# Patient Record
Sex: FEMALE | Race: WHITE | Employment: FULL TIME | URBAN - METROPOLITAN AREA
[De-identification: names, ages, dates, MRNs, and addresses within clinical notes are randomized per-mention and may not be internally consistent; named-entity substitution may affect disease eponyms.]

---

## 2019-04-05 ENCOUNTER — HOSPITAL ENCOUNTER (OUTPATIENT)
Dept: MRI IMAGING | Age: 34
Discharge: HOME | End: 2019-04-05
Attending: ORTHOPAEDIC SURGERY
Payer: MEDICAID

## 2019-04-05 DIAGNOSIS — M25.512 CHRONIC LEFT SHOULDER PAIN: ICD-10-CM

## 2019-04-05 DIAGNOSIS — S43.432A SUPERIOR GLENOID LABRUM LESION OF LEFT SHOULDER: ICD-10-CM

## 2019-04-05 DIAGNOSIS — G89.29 CHRONIC LEFT SHOULDER PAIN: ICD-10-CM

## 2019-04-05 PROCEDURE — 73221 MRI JOINT UPR EXTREM W/O DYE: CPT

## 2019-10-28 ENCOUNTER — IP HISTORICAL/CONVERTED ENCOUNTER (OUTPATIENT)
Dept: OTHER | Age: 34
End: 2019-10-28

## 2020-04-19 ENCOUNTER — ED HISTORICAL/CONVERTED ENCOUNTER (OUTPATIENT)
Dept: OTHER | Age: 35
End: 2020-04-19

## 2020-07-23 ENCOUNTER — HOSPITAL ENCOUNTER (OUTPATIENT)
Dept: MRI IMAGING | Age: 35
Discharge: HOME | End: 2020-07-23
Attending: ORTHOPAEDIC SURGERY
Payer: MEDICAID

## 2020-07-23 DIAGNOSIS — M23.8X1 CHONDRAL DEFECT OF RIGHT PATELLA: ICD-10-CM

## 2020-07-23 PROCEDURE — 73721 MRI JNT OF LWR EXTRE W/O DYE: CPT

## 2020-10-30 ENCOUNTER — HOSPITAL ENCOUNTER (EMERGENCY)
Age: 35
Discharge: HOME | End: 2020-10-30
Attending: EMERGENCY MEDICINE
Payer: MEDICAID

## 2020-10-30 VITALS
SYSTOLIC BLOOD PRESSURE: 147 MMHG | HEART RATE: 109 BPM | TEMPERATURE: 98.7 F | OXYGEN SATURATION: 95 % | RESPIRATION RATE: 18 BRPM | DIASTOLIC BLOOD PRESSURE: 95 MMHG

## 2020-10-30 DIAGNOSIS — M54.41 ACUTE LOW BACK PAIN WITH RIGHT-SIDED SCIATICA, UNSPECIFIED BACK PAIN LATERALITY: Primary | ICD-10-CM

## 2020-10-30 PROCEDURE — 74011250636 HC RX REV CODE- 250/636: Performed by: EMERGENCY MEDICINE

## 2020-10-30 PROCEDURE — 99282 EMERGENCY DEPT VISIT SF MDM: CPT

## 2020-10-30 PROCEDURE — 96372 THER/PROPH/DIAG INJ SC/IM: CPT

## 2020-10-30 RX ORDER — METOPROLOL TARTRATE 50 MG/1
50 TABLET ORAL ONCE
COMMUNITY

## 2020-10-30 RX ORDER — METHYLPREDNISOLONE 4 MG/1
TABLET ORAL
Qty: 1 DOSE PACK | Refills: 0 | Status: SHIPPED | OUTPATIENT
Start: 2020-10-30 | End: 2020-10-30 | Stop reason: SDUPTHER

## 2020-10-30 RX ORDER — KETOROLAC TROMETHAMINE 30 MG/ML
60 INJECTION, SOLUTION INTRAMUSCULAR; INTRAVENOUS
Status: COMPLETED | OUTPATIENT
Start: 2020-10-30 | End: 2020-10-30

## 2020-10-30 RX ORDER — ACETAMINOPHEN AND CODEINE PHOSPHATE 300; 30 MG/1; MG/1
1 TABLET ORAL
Qty: 18 TAB | Refills: 0 | Status: SHIPPED | OUTPATIENT
Start: 2020-10-30 | End: 2020-10-30 | Stop reason: SDUPTHER

## 2020-10-30 RX ORDER — ACETAMINOPHEN AND CODEINE PHOSPHATE 300; 30 MG/1; MG/1
1 TABLET ORAL
Qty: 18 TAB | Refills: 0 | Status: SHIPPED | OUTPATIENT
Start: 2020-10-30 | End: 2020-11-02

## 2020-10-30 RX ORDER — METHYLPREDNISOLONE 4 MG/1
TABLET ORAL
Qty: 1 DOSE PACK | Refills: 0 | Status: SHIPPED | OUTPATIENT
Start: 2020-10-30 | End: 2020-11-28

## 2020-10-30 RX ADMIN — KETOROLAC TROMETHAMINE 60 MG: 30 INJECTION, SOLUTION INTRAMUSCULAR at 16:12

## 2020-10-30 RX ADMIN — METHYLPREDNISOLONE SODIUM SUCCINATE 125 MG: 125 INJECTION, POWDER, FOR SOLUTION INTRAMUSCULAR; INTRAVENOUS at 16:12

## 2020-10-30 NOTE — ED PROVIDER NOTES
EMERGENCY DEPARTMENT HISTORY AND PHYSICAL EXAM      Date: 10/30/2020  Patient Name: Jony Puri    History of Presenting Illness     Chief Complaint   Patient presents with    Back Pain    Leg Pain       History Provided By: Patient    HPI: Jony Puri, 37 y.o. female with a past medical history significant Fibromyalgia and hypertension presents to the ED with cc of low back pain of insidious onset no relieving factors. She complains of focal pain over the right sacroiliac joint region. No history of falls or other injuries. There are no other complaints, changes, or physical findings at this time. PCP: Delores Jenkins NP    No current facility-administered medications on file prior to encounter. Current Outpatient Medications on File Prior to Encounter   Medication Sig Dispense Refill    metoprolol tartrate (LOPRESSOR) 50 mg tablet Take 50 mg by mouth once.  DULoxetine (CYMBALTA) 60 mg capsule Take 60 mg by mouth daily. Past History     Past Medical History:  Past Medical History:   Diagnosis Date    Fibromyalgia     Hypertension        Past Surgical History:  Past Surgical History:   Procedure Laterality Date    HX APPENDECTOMY         Family History:  History reviewed. No pertinent family history. Social History:  Social History     Tobacco Use    Smoking status: Never Smoker    Smokeless tobacco: Never Used   Substance Use Topics    Alcohol use: No    Drug use: No       Allergies:  No Known Allergies      Review of Systems     Review of Systems   Constitutional: Negative. HENT: Negative. Eyes: Negative. Respiratory: Negative. Cardiovascular: Negative. Gastrointestinal: Negative. Endocrine: Negative. Genitourinary: Negative. Musculoskeletal: Positive for back pain. Skin: Negative. Neurological: Negative. Hematological: Negative. Psychiatric/Behavioral: Negative. All other systems reviewed and are negative.       Physical Exam Physical Exam  Vitals signs and nursing note reviewed. Constitutional:       Appearance: Normal appearance. She is normal weight. HENT:      Head: Normocephalic and atraumatic. Nose: Nose normal.      Mouth/Throat:      Mouth: Mucous membranes are moist.      Pharynx: Oropharynx is clear. Eyes:      Extraocular Movements: Extraocular movements intact. Conjunctiva/sclera: Conjunctivae normal.      Pupils: Pupils are equal, round, and reactive to light. Neck:      Musculoskeletal: Normal range of motion and neck supple. Cardiovascular:      Rate and Rhythm: Normal rate and regular rhythm. Pulses: Normal pulses. Heart sounds: Normal heart sounds. Pulmonary:      Effort: Pulmonary effort is normal.      Breath sounds: Normal breath sounds. Abdominal:      General: Abdomen is flat. Palpations: Abdomen is soft. Musculoskeletal: Normal range of motion. General: Tenderness present. Comments: Low back tenderness and tenderness over the right sacroiliac joint   Skin:     General: Skin is warm and dry. Capillary Refill: Capillary refill takes less than 2 seconds. Neurological:      General: No focal deficit present. Mental Status: She is alert and oriented to person, place, and time. Psychiatric:         Mood and Affect: Mood normal.         Behavior: Behavior normal.         Lab and Diagnostic Study Results     Labs -   No results found for this or any previous visit (from the past 12 hour(s)). Radiologic Studies -     CT Results  (Last 48 hours)    None        CXR Results  (Last 48 hours)    None            Medical Decision Making   I am the first provider for this patient. I reviewed the vital signs, available nursing notes, past medical history, past surgical history, family history and social history. Vital Signs-Reviewed the patient's vital signs. No data found.     Records Reviewed: Nursing Notes    ED Course:   Initial assessment performed. The patients presenting problems have been discussed, and they are in agreement with the care plan formulated and outlined with them. I have encouraged them to ask questions as they arise throughout their visit. Provider Notes (Medical Decision Making): Uneventful ED course, clinical improvement with therapy, patient will be discharged to followup with PCP as directed      Disposition   Disposition: Condition stable  Home     Discharged    DISCHARGE PLAN:  1. Current Discharge Medication List      CONTINUE these medications which have NOT CHANGED    Details   metoprolol tartrate (LOPRESSOR) 50 mg tablet Take 50 mg by mouth once. DULoxetine (CYMBALTA) 60 mg capsule Take 60 mg by mouth daily. 2.   Follow-up Information     Follow up With Specialties Details Why Contact Info    John Conroy NP Nurse Practitioner In 2 days  [address] [facility]  [phone]          3. Return to ED if worse   4. Discharge Medication List as of 10/30/2020  4:26 PM      CONTINUE these medications which have CHANGED    Details   acetaminophen-codeine (Tylenol-Codeine #3) 300-30 mg per tablet Take 1 Tab by mouth every four (4) hours as needed for Pain for up to 3 days. Max Daily Amount: 6 Tabs., Normal, Disp-18 Tab,R-0      methylPREDNISolone (Medrol, Elie,) 4 mg tablet Take as directed, Normal, Disp-1 Dose Pack,R-0         CONTINUE these medications which have NOT CHANGED    Details   metoprolol tartrate (LOPRESSOR) 50 mg tablet Take 50 mg by mouth once., Historical Med      DULoxetine (CYMBALTA) 60 mg capsule Take 60 mg by mouth daily. , Historical Med               Diagnosis     Clinical Impression:   1. Acute low back pain with right-sided sciatica, unspecified back pain laterality        Attestations:    Barrie Wallace MD    Please note that this dictation was completed with [employer], the [employer] voice recognition software.   Quite often unanticipated grammatical, syntax, homophones, and other interpretive errors are inadvertently transcribed by the computer software. Please disregard these errors. Please excuse any errors that have escaped final proofreading. Thank you. Patients ED visit today does not show an emergent medical condition that requires inpatient admission; The diagnosis, treatment plan and the need for follow up have been discussed in detail with the patient and he has been given the opportunity to ask questions. All such questions at this time, have been satisfactorily answered; patient remains in stable condition for discharge.

## 2020-10-30 NOTE — ED TRIAGE NOTES
Patient has lower back pain and right leg pain since July with no relief from many medications reports relief with prednisone but it has come back

## 2020-10-30 NOTE — LETTER
[address] [place] [address] 
[phone] Work/School Note Date: 10/30/2020 To Whom It May concern: 
 
Schuyler Candelario was seen and treated today in the emergency room by the following provider(s): 
Attending Provider: Lida Eddy MD. Schuyler Candelario is excused from work/school on 10/30/20 and 10/31/20. She is medically clear to return to work/school on 11/1/2020. Sincerely, Doyle Rhodes

## 2020-10-30 NOTE — DISCHARGE INSTRUCTIONS
Follow-up with your PCP for referral for advanced physical therapy as an outpatient. Take medicines as prescribed. Visit [email or web address] for further information on local clinics for lumbar decompression therapy.

## 2020-11-28 ENCOUNTER — HOSPITAL ENCOUNTER (EMERGENCY)
Age: 35
Discharge: HOME | End: 2020-11-28
Attending: EMERGENCY MEDICINE
Payer: MEDICAID

## 2020-11-28 ENCOUNTER — APPOINTMENT (OUTPATIENT)
Dept: GENERAL RADIOLOGY | Age: 35
End: 2020-11-28
Attending: EMERGENCY MEDICINE
Payer: MEDICAID

## 2020-11-28 VITALS
TEMPERATURE: 98.3 F | RESPIRATION RATE: 20 BRPM | BODY MASS INDEX: 40.02 KG/M2 | DIASTOLIC BLOOD PRESSURE: 78 MMHG | OXYGEN SATURATION: 98 % | HEART RATE: 83 BPM | HEIGHT: 67 IN | WEIGHT: 255 LBS | SYSTOLIC BLOOD PRESSURE: 130 MMHG

## 2020-11-28 DIAGNOSIS — Z20.822 SUSPECTED COVID-19 VIRUS INFECTION: Primary | ICD-10-CM

## 2020-11-28 LAB
DEPRECATED S PYO AG THROAT QL EIA: NEGATIVE
FLUAV AG NPH QL IA: NEGATIVE
FLUBV AG NOSE QL IA: NEGATIVE
SARS-COV-2, COV2: NORMAL

## 2020-11-28 PROCEDURE — 87880 STREP A ASSAY W/OPTIC: CPT

## 2020-11-28 PROCEDURE — 87635 SARS-COV-2 COVID-19 AMP PRB: CPT

## 2020-11-28 PROCEDURE — 71046 X-RAY EXAM CHEST 2 VIEWS: CPT

## 2020-11-28 PROCEDURE — 87804 INFLUENZA ASSAY W/OPTIC: CPT

## 2020-11-28 PROCEDURE — 99282 EMERGENCY DEPT VISIT SF MDM: CPT

## 2020-11-28 RX ORDER — ALBUTEROL SULFATE 90 UG/1
AEROSOL, METERED RESPIRATORY (INHALATION)
COMMUNITY

## 2020-11-28 RX ORDER — BUDESONIDE AND FORMOTEROL FUMARATE DIHYDRATE 160; 4.5 UG/1; UG/1
AEROSOL RESPIRATORY (INHALATION)
COMMUNITY
Start: 2020-09-23

## 2020-11-28 NOTE — LETTER
[address] [place] [id number] [phone] Work/School Note Date: 11/28/2020 To Whom It May concern: 
 
Umm Mosher was evaulated by the following provider(s): 
Attending Provider: Paco Crowley MD.   Kalli Conti virus is suspected. Per the CDC guidelines we recommend home isolation until the following conditions are all met: 1. At least 10 days have passed since symptoms first appeared and 2. At least 24 hours have passed since last fever without the use of fever-reducing medications and 
3. Symptoms (e.g., cough, shortness of breath) have improved Sincerely, 
 
 
Dr. Hubert Crisostomo.  Parker Sheth

## 2020-11-28 NOTE — ED PROVIDER NOTES
EMERGENCY DEPARTMENT HISTORY AND PHYSICAL EXAM      Date: 11/28/2020  Patient Name: Janeen Blake    History of Presenting Illness     Chief Complaint   Patient presents with    Cough       History Provided By: Patient    HPI: Janeen Blake, 37 y.o. female to the emergency department with a 5-day history of headache, nasal congestion, cough, chest discomfort, shortness of breath, intermittent wheezing, muscle aches, fevers, and chills. Patient denies loss of taste or smell. She denies nausea, vomiting, abdominal pain. Patient has been using her albuterol inhaler quite frequently. Patient is also complaining of right buttocks and leg pain that she states is being worked up by her primary care doctor. The patient works for medical transport. There are no other complaints, changes, or physical findings at this time. PCP: Demario Hinkle NP    No current facility-administered medications on file prior to encounter. Current Outpatient Medications on File Prior to Encounter   Medication Sig Dispense Refill    Symbicort 160-4.5 mcg/actuation HFAA INHALE TWO PUFFS BY MOUTH TWICE DAILY      albuterol (ProAir HFA) 90 mcg/actuation inhaler Take  by inhalation every four (4) hours as needed for Wheezing.  metoprolol tartrate (LOPRESSOR) 50 mg tablet Take 50 mg by mouth once.  DULoxetine (CYMBALTA) 60 mg capsule Take 60 mg by mouth daily. Past History     Past Medical History:  Past Medical History:   Diagnosis Date    Fibromyalgia     Hypertension        Past Surgical History:  Past Surgical History:   Procedure Laterality Date    HX APPENDECTOMY         Family History:  History reviewed. No pertinent family history. Social History:  Social History     Tobacco Use    Smoking status: Never Smoker    Smokeless tobacco: Never Used   Substance Use Topics    Alcohol use: No    Drug use: No       Allergies:   Allergies   Allergen Reactions    Abilify [Aripiprazole] Other (comments) hallucinations         Review of Systems     Review of Systems   Constitutional: Positive for chills and fever. HENT: Positive for congestion and sore throat. Eyes: Negative for pain and redness. Respiratory: Positive for cough, chest tightness and shortness of breath. Cardiovascular: Negative for chest pain and leg swelling. Gastrointestinal: Negative for abdominal pain, diarrhea, nausea and vomiting. Endocrine: Positive for heat intolerance. Genitourinary: Negative for dysuria, frequency, hematuria and urgency. Musculoskeletal: Positive for myalgias. Right leg/buttocks pain   Skin: Negative for pallor and rash. Neurological: Negative for dizziness, numbness and headaches. Psychiatric/Behavioral: Negative for agitation and dysphoric mood. Physical Exam     Physical Exam  Vitals signs and nursing note reviewed. Constitutional:       General: She is not in acute distress. Appearance: She is well-developed. She is not ill-appearing or toxic-appearing. HENT:      Head: Normocephalic and atraumatic. Mouth/Throat:      Mouth: Mucous membranes are moist.      Pharynx: No pharyngeal swelling, oropharyngeal exudate or posterior oropharyngeal erythema. Eyes:      General: No scleral icterus. Extraocular Movements: Extraocular movements intact. Conjunctiva/sclera: Conjunctivae normal.      Pupils: Pupils are equal, round, and reactive to light. Neck:      Musculoskeletal: Neck supple. No neck rigidity or muscular tenderness. Cardiovascular:      Rate and Rhythm: Normal rate and regular rhythm. Heart sounds: Normal heart sounds. No murmur. No friction rub. No gallop. Pulmonary:      Effort: Pulmonary effort is normal. No respiratory distress. Breath sounds: Normal breath sounds. No stridor. No wheezing, rhonchi or rales. Abdominal:      General: There is no distension. Palpations:  There is no hepatomegaly, splenomegaly, mass or pulsatile mass.      Tenderness: There is no abdominal tenderness. There is no guarding or rebound. Lymphadenopathy:      Cervical: No cervical adenopathy. Skin:     General: Skin is warm and dry. Capillary Refill: Capillary refill takes less than 2 seconds. Findings: No rash. Neurological:      General: No focal deficit present. Mental Status: She is alert and oriented to person, place, and time. Cranial Nerves: No cranial nerve deficit. Motor: No weakness. Psychiatric:         Mood and Affect: Mood normal.         Behavior: Behavior normal.         Diagnostic Study Results     Labs -     No results found for this or any previous visit (from the past 12 hour(s)). Radiologic Studies -   @lastxrresult@  CT Results  (Last 48 hours)    None        CXR Results  (Last 48 hours)               11/28/20 1515  XR CHEST PA LAT Final result    Impression:  Impression:       No acute cardiopulmonary disease. Narrative:  XR CHEST PA LAT     11/28/2020 3:15 PM ; [facility]        Indication: 37years old; Female ; cough ; implant       Technique: Chest radiographs, 2 views. Comparison: None       Findings: The lungs are clear. The cardiac silhouette and pulmonary vasculature are   normal. The tracheal shadow is unremarkable. There is no evidence for   pneumothorax, pneumoperitoneum, or pleural effusion. The osseous structures are unremarkable for age. Medical Decision Making   I am the first provider for this patient. I reviewed the vital signs, available nursing notes, past medical history, past surgical history, family history and social history. Vital Signs-Reviewed the patient's vital signs. No data found. Records Reviewed: Nursing Notes        Provider Notes (Medical Decision Making):   Patient non-toxic appearing. Lungs clear. CXR clear. Afebrile. Suspected COVID-19. Given quarantine instructions. Patient voices understanding.    [facility]         ED Course:   Initial assessment performed. The patients presenting problems have been discussed, and they are in agreement with the care plan formulated and outlined with them. I have encouraged them to ask questions as they arise throughout their visit. PROCEDURES  Procedures         PLAN:  1. Discharge Medication List as of 11/28/2020  6:34 PM        2. Follow-up Information     Follow up With Specialties Details Why Contact Noris Schafer NP Nurse Practitioner In 2 days As needed, If symptoms worsen [address] [facility] [phone]          Return to ED if worse     Diagnosis     Clinical Impression:   1.  Suspected COVID-19 virus infection

## 2020-11-28 NOTE — ED TRIAGE NOTES
Since Tuesday has had fever, cough, shortness of breath; nasal congestion; sore throat, tired (hx of fibromalgia), ears hurt,

## 2020-11-28 NOTE — DISCHARGE INSTRUCTIONS
You may have COVID-19 infection. You need to quarantine at home until you receive your Covid results. If positive, you will need to continue to quarantine until 10 days from the onset of symptoms and no fever without taking Tylenol or ibuprofen for at least 24 hours. Return to the emergency department any worsening, new, or worrisome symptoms.

## 2020-11-30 ENCOUNTER — HOSPITAL ENCOUNTER (EMERGENCY)
Age: 35
Discharge: HOME | End: 2020-12-01
Attending: EMERGENCY MEDICINE | Admitting: EMERGENCY MEDICINE
Payer: MEDICAID

## 2020-11-30 VITALS
OXYGEN SATURATION: 97 % | RESPIRATION RATE: 16 BRPM | SYSTOLIC BLOOD PRESSURE: 135 MMHG | HEART RATE: 115 BPM | DIASTOLIC BLOOD PRESSURE: 96 MMHG | TEMPERATURE: 98.2 F

## 2020-11-30 DIAGNOSIS — M54.31 RIGHT SIDED SCIATICA: Primary | ICD-10-CM

## 2020-11-30 LAB — SARS-COV-2, COV2NT: NOT DETECTED

## 2020-11-30 PROCEDURE — 96372 THER/PROPH/DIAG INJ SC/IM: CPT

## 2020-11-30 PROCEDURE — 99283 EMERGENCY DEPT VISIT LOW MDM: CPT

## 2020-11-30 RX ORDER — KETOROLAC TROMETHAMINE 30 MG/ML
30 INJECTION, SOLUTION INTRAMUSCULAR; INTRAVENOUS
Status: COMPLETED | OUTPATIENT
Start: 2020-11-30 | End: 2020-12-01

## 2020-11-30 RX ORDER — PREDNISONE 20 MG/1
60 TABLET ORAL
Status: COMPLETED | OUTPATIENT
Start: 2020-11-30 | End: 2020-12-01

## 2020-11-30 RX ORDER — PREDNISONE 50 MG/1
50 TABLET ORAL DAILY
Qty: 7 TAB | Refills: 0 | Status: SHIPPED | OUTPATIENT
Start: 2020-11-30 | End: 2020-12-07

## 2020-12-01 ENCOUNTER — OFFICE VISIT (OUTPATIENT)
Dept: OBGYN CLINIC | Age: 35
End: 2020-12-01
Payer: MEDICAID

## 2020-12-01 VITALS
HEART RATE: 91 BPM | DIASTOLIC BLOOD PRESSURE: 107 MMHG | BODY MASS INDEX: 43 KG/M2 | HEIGHT: 67 IN | SYSTOLIC BLOOD PRESSURE: 148 MMHG | WEIGHT: 274 LBS

## 2020-12-01 DIAGNOSIS — Z11.3 SCREENING FOR VENEREAL DISEASE: ICD-10-CM

## 2020-12-01 DIAGNOSIS — N94.89 SUPPRESSION OF MENSES: ICD-10-CM

## 2020-12-01 DIAGNOSIS — Z12.4 SCREENING FOR MALIGNANT NEOPLASM OF CERVIX: ICD-10-CM

## 2020-12-01 DIAGNOSIS — N90.7 LABIAL CYST: ICD-10-CM

## 2020-12-01 DIAGNOSIS — Z01.419 GYNECOLOGIC EXAM NORMAL: Primary | ICD-10-CM

## 2020-12-01 PROCEDURE — 74011636637 HC RX REV CODE- 636/637: Performed by: PHYSICIAN ASSISTANT

## 2020-12-01 PROCEDURE — 99385 PREV VISIT NEW AGE 18-39: CPT | Performed by: OBSTETRICS & GYNECOLOGY

## 2020-12-01 PROCEDURE — 74011250636 HC RX REV CODE- 250/636: Performed by: PHYSICIAN ASSISTANT

## 2020-12-01 RX ORDER — METOPROLOL SUCCINATE 25 MG/1
TABLET, EXTENDED RELEASE ORAL
COMMUNITY
Start: 2020-09-23 | End: 2020-12-02

## 2020-12-01 RX ADMIN — KETOROLAC TROMETHAMINE 30 MG: 30 INJECTION, SOLUTION INTRAMUSCULAR at 00:06

## 2020-12-01 RX ADMIN — PREDNISONE 60 MG: 20 TABLET ORAL at 00:06

## 2020-12-01 NOTE — ED TRIAGE NOTES
Patient arrives from home d/t RIGHT buttock pain that goes down into her right leg. She states this first started in July and she came in due to her pain level today. She states she has gone to PT and taken gabapentin, robaxin, flexeril, tylenol 3 without relief.

## 2020-12-01 NOTE — ED NOTES
Discharge instructions given to patient by provider. Pt had been given counseling on medication use and verbalizes understanding. Pt ambulated off unit in no signs of distress.

## 2020-12-01 NOTE — ED PROVIDER NOTES
79-year-old female with past medical history of fibromyalgia and hypertension presents to ED due to worsening pain of her right buttock that radiates to her right leg. Patient has been diagnosed with sciatica and has been experiencing this pain x5 months. States she has seen Dr. Noe Abbasi at [address] had multiple x-rays, and has gone through treatments including prednisone, gabapentin, Flexeril, Robaxin. Prednisone seems to help her pain for short periods of time until she has to wean off of it. Also takes Aleve and Tylenol. Denies any recent injury or fall. No fever, chills, weight loss. Occasionally has tingling of her right foot but denies any weakness. Denies saddle anesthesia, urinary retention, or bowel incontinence. Past Medical History:   Diagnosis Date    Fibromyalgia     Hypertension        Past Surgical History:   Procedure Laterality Date    HX APPENDECTOMY           History reviewed. No pertinent family history.     Social History     Socioeconomic History    Marital status: LIFE PARTNER     Spouse name: Not on file    Number of children: Not on file    Years of education: Not on file    Highest education level: Not on file   Occupational History    Not on file   Social Needs    Financial resource strain: Not on file    Food insecurity     Worry: Not on file     Inability: Not on file    Transportation needs     Medical: Not on file     Non-medical: Not on file   Tobacco Use    Smoking status: Never Smoker    Smokeless tobacco: Never Used   Substance and Sexual Activity    Alcohol use: No    Drug use: No    Sexual activity: Yes     Partners: Male     Birth control/protection: Implant   Lifestyle    Physical activity     Days per week: Not on file     Minutes per session: Not on file    Stress: Not on file   Relationships    Social connections     Talks on phone: Not on file     Gets together: Not on file     Attends [employer] service: Not on file     Active member of club or organization: Not on file     Attends meetings of clubs or organizations: Not on file     Relationship status: Not on file    Intimate partner violence     Fear of current or ex partner: Not on file     Emotionally abused: Not on file     Physically abused: Not on file     Forced sexual activity: Not on file   Other Topics Concern    Not on file   Social History Narrative    Not on file         ALLERGIES: Abilify [aripiprazole]    Review of Systems   Constitutional: Negative for fever. HENT: Negative for congestion and sore throat. Respiratory: Negative for cough and shortness of breath. Cardiovascular: Negative for chest pain. Gastrointestinal: Negative for nausea and vomiting. Genitourinary: Negative for dysuria. Musculoskeletal: Positive for back pain (radiating from low right back to right buttock and back of right calf) and gait problem. Negative for myalgias. Skin: Negative for rash. Neurological: Positive for numbness (\"tingling\" right foot intermittent). Negative for dizziness and weakness. Vitals:    11/30/20 2037   BP: (!) 135/96   Pulse: (!) 115   Resp: 16   Temp: 98.2 °F (36.8 °C)   SpO2: 97%            Physical Exam  Vitals signs and nursing note reviewed. Constitutional:       General: She is not in acute distress. HENT:      Head: Normocephalic. Nose: Nose normal.      Mouth/Throat:      Mouth: Mucous membranes are moist.   Eyes:      Extraocular Movements: Extraocular movements intact. Neck:      Musculoskeletal: Normal range of motion. Cardiovascular:      Rate and Rhythm: Normal rate and regular rhythm. Pulmonary:      Effort: Pulmonary effort is normal. No respiratory distress. Musculoskeletal:      Right lower leg: No edema. Left lower leg: No edema. Skin:     General: Skin is dry. Findings: No rash. Neurological:      General: No focal deficit present.       Mental Status: She is alert and oriented to person, place, and time.      Cranial Nerves: Cranial nerves are intact. Sensory: Sensation is intact. Motor: Motor function is intact. Coordination: Coordination is intact. Gait: Gait abnormal (antalgic). Psychiatric:         Mood and Affect: Mood normal.        Medications   predniSONE (DELTASONE) tablet 60 mg (60 mg Oral Given 12/1/20 0006)   ketorolac (TORADOL) injection 30 mg (30 mg IntraMUSCular Given 12/1/20 0006)     Labs Reviewed - No data to display  No orders to display         MDM  Number of Diagnoses or Management Options  Right sided sciatica:   Diagnosis management comments: Differential diagnosis includes sciatica, acute lumbar injury, cauda equina syndrome, and others    Chronic sciatica with exacerbation, will treat acutely with Toradol and prednisone, prednisone x5 days as well. Discussed appropriate follow-up with orthopedics as her sciatica has not improved overall.  Return precautions reviewed             Procedures      Tommy Aguilar PA-C  12/1/2020

## 2020-12-01 NOTE — DISCHARGE INSTRUCTIONS
Patient Education        Sciatica: Care Instructions  Your Care Instructions     Sciatica (say \"pup-JQ-kg-kuh\") is an irritation of one of the sciatic nerves, which come from the spinal cord in the lower back. The sciatic nerves and their branches extend down through the buttock to the foot. Sciatica can develop when an injured disc in the back irritates or presses against a spinal nerve root. Its main symptom is pain, numbness, or weakness that is often worse in the leg or foot than in the back. Sciatica often will improve and go away with time. Early treatment usually includes medicines and exercises to relieve pain. Follow-up care is a key part of your treatment and safety. Be sure to make and go to all appointments, and call your doctor if you are having problems. It's also a good idea to know your test results and keep a list of the medicines you take. How can you care for yourself at home? · Take pain medicines exactly as directed. ? If the doctor gave you a prescription medicine for pain, take it as prescribed. ? If you are not taking a prescription pain medicine, ask your doctor if you can take an over-the-counter medicine. · Use heat or ice to relieve pain. ? To apply heat, put a warm water bottle, heating pad set on low, or warm cloth on your back. Do not go to sleep with a heating pad on your skin. ? To use ice, put ice or a cold pack on the area for 10 to 20 minutes at a time. Put a thin cloth between the ice and your skin. · Avoid sitting if possible, unless it feels better than standing. · Alternate lying down with short walks. Increase your walking distance as you are able to without making your symptoms worse. · Do not do anything that makes your symptoms worse. When should you call for help? Call 911 anytime you think you may need emergency care. For example, call if:    · You are unable to move a leg at all.    Call your doctor now or seek immediate medical care if:    · You have new or worse symptoms in your legs or buttocks. Symptoms may include:  ? Numbness or tingling. ? Weakness. ? Pain.     · You lose bladder or bowel control. Watch closely for changes in your health, and be sure to contact your doctor if:    · You are not getting better as expected. Where can you learn more? Go to http://www.gray.com/  Enter Z239 in the search box to learn more about \"Sciatica: Care Instructions. \"  Current as of: March 2, 2020               Content Version: 12.6  © 1722-2000 [employer]. Care instructions adapted under license by [employer] (which disclaims liability or warranty for this information). If you have questions about a medical condition or this instruction, always ask your healthcare professional. [address] any warranty or liability for your use of this information.

## 2020-12-02 ENCOUNTER — HOSPITAL ENCOUNTER (EMERGENCY)
Age: 35
Discharge: HOME | End: 2020-12-03
Attending: EMERGENCY MEDICINE
Payer: MEDICAID

## 2020-12-02 DIAGNOSIS — I10 ESSENTIAL HYPERTENSION: ICD-10-CM

## 2020-12-02 DIAGNOSIS — N75.0 CYST OF LEFT BARTHOLIN'S GLAND: Primary | ICD-10-CM

## 2020-12-02 PROCEDURE — 74011000250 HC RX REV CODE- 250: Performed by: EMERGENCY MEDICINE

## 2020-12-02 PROCEDURE — 75810000117 HC INC/DRN VULVA/PERINEUM

## 2020-12-02 PROCEDURE — 99283 EMERGENCY DEPT VISIT LOW MDM: CPT

## 2020-12-02 RX ORDER — LIDOCAINE HYDROCHLORIDE 10 MG/ML
5 INJECTION INFILTRATION; PERINEURAL ONCE
Status: COMPLETED | OUTPATIENT
Start: 2020-12-02 | End: 2020-12-02

## 2020-12-02 RX ORDER — SULFAMETHOXAZOLE AND TRIMETHOPRIM 800; 160 MG/1; MG/1
1 TABLET ORAL 2 TIMES DAILY
Qty: 14 TAB | Refills: 0 | Status: SHIPPED | OUTPATIENT
Start: 2020-12-02 | End: 2020-12-09

## 2020-12-02 RX ADMIN — LIDOCAINE HYDROCHLORIDE 50 ML: 10 INJECTION, SOLUTION INFILTRATION; PERINEURAL at 22:27

## 2020-12-03 VITALS
SYSTOLIC BLOOD PRESSURE: 155 MMHG | BODY MASS INDEX: 40.81 KG/M2 | WEIGHT: 260 LBS | TEMPERATURE: 98.1 F | OXYGEN SATURATION: 98 % | HEIGHT: 67 IN | HEART RATE: 76 BPM | RESPIRATION RATE: 20 BRPM | DIASTOLIC BLOOD PRESSURE: 105 MMHG

## 2020-12-03 LAB
C TRACH RRNA CVX QL NAA+PROBE: NEGATIVE
CYTOLOGIST CVX/VAG CYTO: NORMAL
CYTOLOGY CVX/VAG DOC CYTO: NORMAL
CYTOLOGY CVX/VAG DOC THIN PREP: NORMAL
LABCORP, 190119: NORMAL
Lab: NORMAL
N GONORRHOEA RRNA CVX QL NAA+PROBE: NEGATIVE
OTHER STN SPEC: NORMAL
STAT OF ADQ CVX/VAG CYTO-IMP: NORMAL
T VAGINALIS RRNA SPEC QL NAA+PROBE: NEGATIVE

## 2020-12-03 PROCEDURE — 87205 SMEAR GRAM STAIN: CPT

## 2020-12-03 RX ORDER — HYDROCODONE BITARTRATE AND ACETAMINOPHEN 5; 325 MG/1; MG/1
1 TABLET ORAL ONCE
Status: DISCONTINUED | OUTPATIENT
Start: 2020-12-03 | End: 2020-12-03

## 2020-12-03 RX ORDER — IBUPROFEN 600 MG/1
600 TABLET ORAL
Qty: 20 TAB | Refills: 0 | Status: SHIPPED | OUTPATIENT
Start: 2020-12-03

## 2020-12-03 NOTE — ED TRIAGE NOTES
X 6 days, cyst to left labia size of pea till today, Saw OB yesterday and was size of pea, wanted to wait to see if it gets bigger but started swelling today. Tylenol 1900 tonight. 10/10 pain, hurts to move, sit, or walk.

## 2020-12-03 NOTE — ED PROVIDER NOTES
EMERGENCY DEPARTMENT HISTORY AND PHYSICAL EXAM      Date: 12/2/2020  Patient Name: Lennox Diaz    History of Presenting Illness     Chief Complaint   Patient presents with    Cyst       History Provided By: Patient    HPI: Lennox Diaz, 37 y.o. female with a past medical history significant hypertension presents to the ED with cc of left vulva cyst for 6 days getting worse. Saw GYN yesterday who called in an antibiotic. There are no other complaints, changes, or physical findings at this time. PCP: Robert Velazquez NP    No current facility-administered medications on file prior to encounter. Current Outpatient Medications on File Prior to Encounter   Medication Sig Dispense Refill    predniSONE (DELTASONE) 50 mg tablet Take 1 Tab by mouth daily for 7 days. 7 Tab 0    Symbicort 160-4.5 mcg/actuation HFAA INHALE TWO PUFFS BY MOUTH TWICE DAILY      albuterol (ProAir HFA) 90 mcg/actuation inhaler Take  by inhalation every four (4) hours as needed for Wheezing.  metoprolol tartrate (LOPRESSOR) 50 mg tablet Take 50 mg by mouth once.  DULoxetine (CYMBALTA) 60 mg capsule Take 60 mg by mouth daily.  trimethoprim-sulfamethoxazole (BACTRIM DS, SEPTRA DS) 160-800 mg per tablet Take 1 Tab by mouth two (2) times a day for 7 days. 14 Tab 0       Past History     Past Medical History:  Past Medical History:   Diagnosis Date    Fibromyalgia     Fibromyalgia     Hypertension     Sciatica        Past Surgical History:  Past Surgical History:   Procedure Laterality Date    HX APPENDECTOMY      HX ORTHOPAEDIC         Family History:  History reviewed. No pertinent family history. Social History:  Social History     Tobacco Use    Smoking status: Never Smoker    Smokeless tobacco: Never Used   Substance Use Topics    Alcohol use: No    Drug use: No       Allergies:   Allergies   Allergen Reactions    Abilify [Aripiprazole] Other (comments)     hallucinations         Review of Systems Review of Systems   Constitutional: Negative. HENT: Negative. Respiratory: Negative. Cardiovascular: Negative. Gastrointestinal: Negative. Genitourinary:        Left vulva cyst   Musculoskeletal: Negative. Neurological: Negative. All other systems reviewed and are negative. Physical Exam     Physical Exam  Vitals signs and nursing note reviewed. Constitutional:       Appearance: Normal appearance. She is obese. Cardiovascular:      Rate and Rhythm: Normal rate and regular rhythm. Pulses: Normal pulses. Heart sounds: Normal heart sounds. Pulmonary:      Effort: Pulmonary effort is normal.      Breath sounds: Normal breath sounds. Abdominal:      Palpations: Abdomen is soft. Tenderness: There is no abdominal tenderness. Genitourinary:     Comments: 2 cm left Bartholin's cyst. Tender to touch  Neurological:      Mental Status: She is alert. Lab and Diagnostic Study Results     Labs -   No results found for this or any previous visit (from the past 12 hour(s)). Radiologic Studies -   @lastxrresult@  CT Results  (Last 48 hours)    None        CXR Results  (Last 48 hours)    None            Medical Decision Making   - I am the first provider for this patient. - I reviewed the vital signs, available nursing notes, past medical history, past surgical history, family history and social history. - Initial assessment performed. The patients presenting problems have been discussed, and they are in agreement with the care plan formulated and outlined with them. I have encouraged them to ask questions as they arise throughout their visit. Vital Signs-Reviewed the patient's vital signs.   Patient Vitals for the past 12 hrs:   Temp Pulse Resp BP SpO2   12/02/20 2152  82 18 (!) 143/104 98 %   12/02/20 2143 98.1 °F (36.7 °C) 71 12 (!) 153/112 98 %       Records Reviewed: Nursing Notes    The patient presents with       ED Course:          Provider Notes (Medical Decision Making): MDM         Procedures   Medical Decision Makingedical Decision Making  Performed by: Monse Elmore MD  PROCEDURES:  I&D Abcess Simple    Date/Time: 12/3/2020 12:43 AM  Performed by: Gino Meredith MD  Authorized by: Gino Meredith MD     Consent:     Consent obtained:  Verbal    Consent given by:  Patient    Risks discussed:  Bleeding and incomplete drainage    Alternatives discussed:  Observation  Location:     Type:  Bartholin cyst    Size:  3 cm    Location:  Anogenital    Anogenital location:  Bartholin's gland  Pre-procedure details:     Skin preparation:  Betadine  Anesthesia (see MAR for exact dosages): Anesthesia method:  Local infiltration    Local anesthetic:  Lidocaine 1% w/o epi  Procedure type:     Complexity:  Simple  Procedure details:     Needle aspiration: no      Incision types:  Single straight    Incision depth:  Subcutaneous    Scalpel blade:  11    Wound management:  Probed and deloculated and irrigated with saline    Drainage:  Purulent    Drainage amount:  Copious    Wound treatment:  Drain placed    Packing materials: Word catheter  Post-procedure details:     Patient tolerance of procedure: Tolerated well, no immediate complications           Disposition   Disposition: DC- Adult Discharges: All of the diagnostic tests were reviewed and questions answered. Diagnosis, care plan and treatment options were discussed. The patient understands the instructions and will follow up as directed. The patients results have been reviewed with them. They have been counseled regarding their diagnosis. The patient verbally convey understanding and agreement of the signs, symptoms, diagnosis, treatment and prognosis and additionally agrees to follow up as recommended with their PCP in 24 - 48 hours. They also agree with the care-plan and convey that all of their questions have been answered.   I have also put together some discharge instructions for them that include: 1) educational information regarding their diagnosis, 2) how to care for their diagnosis at home, as well a 3) list of reasons why they would want to return to the ED prior to their follow-up appointment, should their condition change. Discharged    DISCHARGE PLAN:  1. Current Discharge Medication List      CONTINUE these medications which have NOT CHANGED    Details   predniSONE (DELTASONE) 50 mg tablet Take 1 Tab by mouth daily for 7 days. Qty: 7 Tab, Refills: 0      Symbicort 160-4.5 mcg/actuation HFAA INHALE TWO PUFFS BY MOUTH TWICE DAILY      albuterol (ProAir HFA) 90 mcg/actuation inhaler Take  by inhalation every four (4) hours as needed for Wheezing. metoprolol tartrate (LOPRESSOR) 50 mg tablet Take 50 mg by mouth once. DULoxetine (CYMBALTA) 60 mg capsule Take 60 mg by mouth daily. trimethoprim-sulfamethoxazole (BACTRIM DS, SEPTRA DS) 160-800 mg per tablet Take 1 Tab by mouth two (2) times a day for 7 days. Qty: 14 Tab, Refills: 0    Associated Diagnoses: Labial cyst           2. Follow-up Information     Follow up With Specialties Details Why Contact Info    Suraj Garcia NP Nurse Practitioner In 3 days   [address] [place] [id number] [phone]      Shala Duffy MD Obstetrics & Gynecology, Gynecology, Obstetrics In 2 days  [address] [facility] [address] [facility]  [phone]          3. Return to ED if worse   4. Current Discharge Medication List      START taking these medications    Details   ibuprofen (MOTRIN) 600 mg tablet Take 1 Tab by mouth every six (6) hours as needed for Pain. Qty: 20 Tab, Refills: 0               Diagnosis     Clinical Impression:   1. Cyst of left Bartholin's gland    2. Essential hypertension        Attestations:    Cande Booker MD    Please note that this dictation was completed with [employer] the [employer] voice recognition software.   Quite often unanticipated grammatical, syntax, homophones, and other interpretive errors are inadvertently transcribed by the computer software. Please disregard these errors. Please excuse any errors that have escaped final proofreading. Thank you.

## 2020-12-04 ENCOUNTER — OFFICE VISIT (OUTPATIENT)
Dept: OBGYN CLINIC | Age: 35
End: 2020-12-04
Payer: MEDICAID

## 2020-12-04 VITALS
HEIGHT: 67 IN | WEIGHT: 272 LBS | SYSTOLIC BLOOD PRESSURE: 142 MMHG | BODY MASS INDEX: 42.69 KG/M2 | DIASTOLIC BLOOD PRESSURE: 92 MMHG

## 2020-12-04 DIAGNOSIS — N76.4 LABIAL ABSCESS: Primary | ICD-10-CM

## 2020-12-04 PROCEDURE — 99213 OFFICE O/P EST LOW 20 MIN: CPT | Performed by: OBSTETRICS & GYNECOLOGY

## 2020-12-04 NOTE — PROGRESS NOTES
72-year-old white female,  4 para 3-0-1-3, last menstrual period unknown, currently on Nexplanon, history of of hypertension, sciatica, fibromyalgia, history of appendectomy, prior orthopedic procedures, who presents for annual gynecologic exam.    Patient currently complains about a lump on her left labia minora. It is tender to palpation, and has not drained anything. She is sexually active with the same partner over the last 2 years. Last sexual encounter was approximately 1 to 2 weeks ago and she denies pain or bleeding with intercourse. Past Medical History:   Diagnosis Date    Fibromyalgia     Fibromyalgia     Hypertension     Sciatica      Past Surgical History:   Procedure Laterality Date    HX APPENDECTOMY      HX ORTHOPAEDIC        Social History     Socioeconomic History    Marital status: LIFE PARTNER     Spouse name: Not on file    Number of children: Not on file    Years of education: Not on file    Highest education level: Not on file   Tobacco Use    Smoking status: Never Smoker    Smokeless tobacco: Never Used   Substance and Sexual Activity    Alcohol use: No    Drug use: No    Sexual activity: Yes     Partners: Male     Birth control/protection: Implant      History reviewed. No pertinent family history. Current Outpatient Medications on File Prior to Visit   Medication Sig Dispense Refill    Symbicort 160-4.5 mcg/actuation HFAA INHALE TWO PUFFS BY MOUTH TWICE DAILY      albuterol (ProAir HFA) 90 mcg/actuation inhaler Take  by inhalation every four (4) hours as needed for Wheezing.  metoprolol tartrate (LOPRESSOR) 50 mg tablet Take 50 mg by mouth once.  DULoxetine (CYMBALTA) 60 mg capsule Take 60 mg by mouth daily. No current facility-administered medications on file prior to visit.       Allergies as of 2020 - Review Complete 2020   Allergen Reaction Noted    Abilify [aripiprazole] Other (comments) 2020       Constitutional: Chaperone: accepted and present. General Appearance: healthy-appearing, well-nourished, and well-developed; obese white female. Level of Distress: NAD. Ambulation: ambulating normally. Psychiatric:   Insight: good judgement. Mental Status: normal mood and affect and active and alert. Orientation: to time, place, and person. Memory: recent memory normal and remote memory normal.     Head: Head: normocephalic and atraumatic. Neck:   Neck: supple, FROM, trachea midline, and no masses. Lymph Nodes: no cervical LAD, supraclavicular LAD, axillary LAD, or inguinal LAD. Thyroid: no enlargement or nodules and non-tender. Lungs:   Respiratory effort: no dyspnea. Auscultation: no wheezing, rales/crackles, or rhonchi and breath sounds normal, good air movement, and CTA except as noted. Cardiovascular:   Heart Auscultation: normal S1 and S2; no murmurs, rubs, or gallops; and RRR. Pulses including femoral / pedal: normal throughout. Breast: Breast: no masses or abnormal secretions and normal appearance. Abdomen: Bowel Sounds: normal. Inspection and Palpation: no tenderness, guarding, masses, rebound tenderness, or CVA tenderness and non-distended. Liver: non-tender and no hepatomegaly. Spleen: non-tender and no splenomegaly. Hernia: none palpable. Incisions well-healed laparoscopic incisions    Female :   External genitalia: 1-2 cm cystic mass in the left labia minora at its apical aspect, with mild erythema. Without drainage  Vagina: moist mucosa; scant white/yellow discharge. Cervix: no discharge, inflammation, or cervical motion tenderness   Uterus: midline, smooth, and non-tender; normal size   Adnexae: no adnexal mass or tenderness and size WNL. Bladder and Urethra: normal bladder and urethra (except where noted). Musculoskeletal[de-identified]   Motor Strength and Tone: normal tone and motor strength.    Joints, Bones, and Muscles: no contractures, malalignment, tenderness, or bony abnormalities and normal movement of all extremities. Extremities: no cyanosis, edema, varicosities, or palpable cord. Neurologic:   Gait and Station: normal gait and station. Sensation: grossly intact. Reflexes: DTRs 2+ bilaterally throughout. Skin:   Inspection and palpation: no rash, lesions, ulcer, induration, nodules, jaundice, or abnormal nevi and good turgor. Nails: normal.     Back: Thoracolumbar Appearance: normal curvature. ICD-10-CM ICD-9-CM    1. Gynecologic exam normal  Z01.419 V72.31    2. Screening for malignant neoplasm of cervix  Z12.4 V76.2 PAP IG, CT-NG TV RFX HPV HVPL([id number], [id number])   3. Labial cyst  N90.7 624.8 trimethoprim-sulfamethoxazole (BACTRIM DS, SEPTRA DS) 160-800 mg per tablet   4. Screening for venereal disease  Z11.3 V74.5    5. Suppression of menses  N94.89 626.8      Labial cyst? Abscess versus cellulitiswe will treat with Bactrim,  Check Pap smear, STD screen  Encourage self breast examination, vitamin D consumption, exercise  Follow-up in 3-4 weeks for Nexplanon removal, and reinsertion.

## 2021-01-19 ENCOUNTER — OFFICE VISIT (OUTPATIENT)
Dept: OBGYN CLINIC | Age: 36
End: 2021-01-19
Payer: MEDICAID

## 2021-01-19 VITALS
SYSTOLIC BLOOD PRESSURE: 150 MMHG | HEIGHT: 67 IN | BODY MASS INDEX: 43.32 KG/M2 | DIASTOLIC BLOOD PRESSURE: 110 MMHG | WEIGHT: 276 LBS

## 2021-01-19 DIAGNOSIS — N94.89 SUPPRESSION OF MENSES: ICD-10-CM

## 2021-01-19 DIAGNOSIS — N92.6 IRREGULAR MENSES: Primary | ICD-10-CM

## 2021-01-19 PROCEDURE — 11983 REMOVE/INSERT DRUG IMPLANT: CPT | Performed by: OBSTETRICS & GYNECOLOGY

## 2021-01-25 ENCOUNTER — TELEPHONE (OUTPATIENT)
Dept: OBGYN CLINIC | Age: 36
End: 2021-01-25

## 2021-01-29 NOTE — PROGRESS NOTES
36-year-old white female,  4 para 3-0-1-3, last menstrual period unknown, currently on Nexplanon, history of of hypertension, sciatica, fibromyalgia, history of appendectomy, prior orthopedic procedures, seen recently for annual gynecologic exam, at which time she had a labial cyst,, who now presents for ER follow-up.    Patient states that the cyst got progressively larger, and more painful and she went to the emergency room, where the lesion was lanced.  She reports that it feels much better now, and she continues on Bactrim    She is sexually active with the same partner over the last 2 years.  Last sexual encounter was approximately  2 weeks ago and she denies pain or bleeding with intercourse.      Past Medical History:   Diagnosis Date   • Fibromyalgia    • Fibromyalgia    • Hypertension    • Sciatica      Past Surgical History:   Procedure Laterality Date   • HX APPENDECTOMY     • HX ORTHOPAEDIC        Social History     Socioeconomic History   • Marital status: LIFE PARTNER     Spouse name: Not on file   • Number of children: Not on file   • Years of education: Not on file   • Highest education level: Not on file   Tobacco Use   • Smoking status: Never Smoker   • Smokeless tobacco: Never Used   Substance and Sexual Activity   • Alcohol use: No   • Drug use: No   • Sexual activity: Yes     Partners: Male     Birth control/protection: Implant      History reviewed. No pertinent family history.   Current Outpatient Medications on File Prior to Visit   Medication Sig Dispense Refill   • ibuprofen (MOTRIN) 600 mg tablet Take 1 Tab by mouth every six (6) hours as needed for Pain. 20 Tab 0   • Symbicort 160-4.5 mcg/actuation HFAA INHALE TWO PUFFS BY MOUTH TWICE DAILY     • albuterol (ProAir HFA) 90 mcg/actuation inhaler Take  by inhalation every four (4) hours as needed for Wheezing.     • metoprolol tartrate (LOPRESSOR) 50 mg tablet Take 50 mg by mouth once.     • DULoxetine (CYMBALTA) 60 mg capsule Take 60  mg by mouth daily. No current facility-administered medications on file prior to visit. Allergies as of 12/04/2020 - Review Complete 12/04/2020   Allergen Reaction Noted    Abilify [aripiprazole] Other (comments) 11/28/2020       Constitutional:   Chaperone: accepted and present. General Appearance: healthy-appearing, well-nourished, and well-developed; obese white female. Level of Distress: NAD. Ambulation: ambulating normally. Psychiatric:   Insight: good judgement. Mental Status: normal mood and affect and active and alert. Orientation: to time, place, and person. Memory: recent memory normal and remote memory normal.     Head: Head: normocephalic and atraumatic. Neck:   Neck: supple, FROM, trachea midline, and no masses. Lymph Nodes: no cervical LAD, supraclavicular LAD, axillary LAD, or inguinal LAD. Thyroid: no enlargement or nodules and non-tender. Lungs:   Respiratory effort: no dyspnea. Auscultation: no wheezing, rales/crackles, or rhonchi and breath sounds normal, good air movement, and CTA except as noted. Cardiovascular:   Heart Auscultation: normal S1 and S2; no murmurs, rubs, or gallops; and RRR. Pulses including femoral / pedal: normal throughout. Abdomen: Bowel Sounds: normal. Inspection and Palpation: no tenderness, guarding, masses, rebound tenderness, or CVA tenderness and non-distended. Liver: non-tender and no hepatomegaly. Spleen: non-tender and no splenomegaly. Hernia: none palpable. Incisions well-healed laparoscopic incisions    Female :   External genitalia: There is a small defect on the medial aspect of the labia minora on the left side, with scant drainage, no induration or erythema    Musculoskeletal[de-identified]   Motor Strength and Tone: normal tone and motor strength. Joints, Bones, and Muscles: no contractures, malalignment, tenderness, or bony abnormalities and normal movement of all extremities.    Extremities: no cyanosis, edema, varicosities, or palpable cord. Neurologic:   Gait and Station: normal gait and station. Sensation: grossly intact. Reflexes: DTRs 2+ bilaterally throughout. Skin:   Inspection and palpation: no rash, lesions, ulcer, induration, nodules, jaundice, or abnormal nevi and good turgor. Nails: normal.     Back: Thoracolumbar Appearance: normal curvature.        ICD-10-CM ICD-9-CM    1. Labial abscess  N76.4 616.4      Labial abscess status post drainage  Check Pap smear, STD screen  Follow-up for Nexplanon removal, and reinsertion

## 2021-01-29 NOTE — PROGRESS NOTES
81-year-old white female,  4 para 3-0-1-3, last menstrual period unknown, currently on Nexplanon, history of of hypertension, sciatica, fibromyalgia, history of appendectomy, prior orthopedic procedures, who presents for Nexplanon removal..    Patient is currently without complaints. She denies vaginal discharge itching odor pelvic pain or vaginal bleeding. She is sexually active with the same partner over the last 2 years. Last sexual encounter was approximately  2 months ago and she denies pain or bleeding with intercourse. Last Pap smear 2020negative    Patient is a non-smoker  Patient is currently unemployedformer EMT    Past Medical History:   Diagnosis Date    Fibromyalgia     Fibromyalgia     Hypertension     Sciatica      Past Surgical History:   Procedure Laterality Date    HX APPENDECTOMY      HX ORTHOPAEDIC        Social History     Socioeconomic History    Marital status: LIFE PARTNER     Spouse name: Not on file    Number of children: Not on file    Years of education: Not on file    Highest education level: Not on file   Tobacco Use    Smoking status: Never Smoker    Smokeless tobacco: Never Used   Substance and Sexual Activity    Alcohol use: No    Drug use: No    Sexual activity: Yes     Partners: Male     Birth control/protection: Implant      History reviewed. No pertinent family history. Current Outpatient Medications on File Prior to Visit   Medication Sig Dispense Refill    ibuprofen (MOTRIN) 600 mg tablet Take 1 Tab by mouth every six (6) hours as needed for Pain. 20 Tab 0    Symbicort 160-4.5 mcg/actuation HFAA INHALE TWO PUFFS BY MOUTH TWICE DAILY      albuterol (ProAir HFA) 90 mcg/actuation inhaler Take  by inhalation every four (4) hours as needed for Wheezing.  metoprolol tartrate (LOPRESSOR) 50 mg tablet Take 50 mg by mouth once.  DULoxetine (CYMBALTA) 60 mg capsule Take 60 mg by mouth daily.        No current facility-administered medications on file prior to visit. Allergies as of 01/19/2021 - Review Complete 01/19/2021   Allergen Reaction Noted    Abilify [aripiprazole] Other (comments) 11/28/2020       Constitutional:   Chaperone: accepted and present. General Appearance: healthy-appearing, well-nourished, and well-developed; obese white female. Level of Distress: NAD. Ambulation: ambulating normally. Psychiatric:   Insight: good judgement. Mental Status: normal mood and affect and active and alert. Orientation: to time, place, and person. Memory: recent memory normal and remote memory normal.     Head: Head: normocephalic and atraumatic. ICD-10-CM ICD-9-CM    1.  Irregular menses  N92.6 626.4 etonogestreL impl 68 mg     Nexplanon removed and reinserted without difficulty

## 2021-06-18 ENCOUNTER — HOSPITAL ENCOUNTER (EMERGENCY)
Age: 36
Discharge: HOME | End: 2021-06-18
Attending: EMERGENCY MEDICINE
Payer: MEDICAID

## 2021-06-18 VITALS
SYSTOLIC BLOOD PRESSURE: 129 MMHG | HEART RATE: 69 BPM | OXYGEN SATURATION: 98 % | DIASTOLIC BLOOD PRESSURE: 87 MMHG | HEIGHT: 67 IN | WEIGHT: 270 LBS | TEMPERATURE: 99.1 F | BODY MASS INDEX: 42.38 KG/M2 | RESPIRATION RATE: 18 BRPM

## 2021-06-18 DIAGNOSIS — N94.89 LABIAL PAIN: Primary | ICD-10-CM

## 2021-06-18 PROCEDURE — 99283 EMERGENCY DEPT VISIT LOW MDM: CPT

## 2021-06-18 RX ORDER — SULFAMETHOXAZOLE AND TRIMETHOPRIM 800; 160 MG/1; MG/1
1 TABLET ORAL 2 TIMES DAILY
Qty: 14 TABLET | Refills: 0 | Status: SHIPPED | OUTPATIENT
Start: 2021-06-18 | End: 2021-06-25

## 2021-06-19 NOTE — ED NOTES
patient discharged with prescriptions sent to patient's pharmacy. Vitals repeated and stable. Patient ambulated with steady gate to private car. Underrstood discharge instructions.  No concerns at this time

## 2021-06-19 NOTE — ED TRIAGE NOTES
Pt has a bartholoin cyst has hx of them as well. Pt states has had it since day 2 doing sitz bath no help.

## 2021-06-19 NOTE — ED PROVIDER NOTES
EMERGENCY DEPARTMENT HISTORY AND PHYSICAL EXAM      Date: 6/18/2021  Patient Name: Genevieve Layne    History of Presenting Illness     Chief Complaint   Patient presents with    Abscess       History Provided By: Patient    HPI: Genevieve Layne, 31 y.o. female with a past medical history significant HTN, fibromyalgia, sciatica, Bartholin cyst presents to the ED with cc of pain to the left internal labia onset 2 days ago and worsening. Patient reports that she thinks she has another Bartholin cyst abscess. Patient has been doing sitz bath's without relief of her pain. She reports that she tried to squeeze the area and had a little bit of drainage. She specifically denies dysuria, hematuria, vaginal discharge, fever, chills, body aches, any chance of pregnancy. There are no other complaints, changes, or physical findings at this time. PCP: Claudean Caraway, NP    No current facility-administered medications on file prior to encounter. Current Outpatient Medications on File Prior to Encounter   Medication Sig Dispense Refill    ibuprofen (MOTRIN) 600 mg tablet Take 1 Tab by mouth every six (6) hours as needed for Pain. 20 Tab 0    Symbicort 160-4.5 mcg/actuation HFAA INHALE TWO PUFFS BY MOUTH TWICE DAILY      albuterol (ProAir HFA) 90 mcg/actuation inhaler Take  by inhalation every four (4) hours as needed for Wheezing.  metoprolol tartrate (LOPRESSOR) 50 mg tablet Take 50 mg by mouth once.  DULoxetine (CYMBALTA) 60 mg capsule Take 60 mg by mouth daily. Past History     Past Medical History:  Past Medical History:   Diagnosis Date    Bartholin cyst     Fibromyalgia     Fibromyalgia     Herniated lumbar intervertebral disc     Hypertension     Sciatica        Past Surgical History:  Past Surgical History:   Procedure Laterality Date    HX APPENDECTOMY      HX ORTHOPAEDIC         Family History:  History reviewed. No pertinent family history.     Social History:  Social History     Tobacco Use    Smoking status: Never Smoker    Smokeless tobacco: Never Used   Substance Use Topics    Alcohol use: No    Drug use: No       Allergies: Allergies   Allergen Reactions    Abilify [Aripiprazole] Other (comments)     hallucinations         Review of Systems   Review of Systems   Constitutional: Negative for activity change, chills and fever. HENT: Negative for congestion, ear pain, rhinorrhea, sneezing and sore throat. Eyes: Negative for pain and visual disturbance. Respiratory: Negative for cough and shortness of breath. Cardiovascular: Negative for chest pain. Gastrointestinal: Negative for abdominal pain, diarrhea, nausea and vomiting. Genitourinary: Positive for vaginal pain. Negative for dysuria, hematuria, vaginal bleeding and vaginal discharge. Musculoskeletal: Negative for gait problem. Skin: Negative for rash. Neurological: Negative for speech difficulty, weakness and headaches. Psychiatric/Behavioral: The patient is not nervous/anxious. All other systems reviewed and are negative. Physical Exam   Physical Exam  Vitals and nursing note reviewed. Exam conducted with a chaperone present (Neelam ANN). Constitutional:       General: She is not in acute distress. Appearance: Normal appearance. She is not toxic-appearing. HENT:      Head: Normocephalic and atraumatic. Nose: Nose normal.      Mouth/Throat:      Mouth: Mucous membranes are moist.   Eyes:      Extraocular Movements: Extraocular movements intact. Conjunctiva/sclera: Conjunctivae normal.      Pupils: Pupils are equal, round, and reactive to light. Cardiovascular:      Rate and Rhythm: Normal rate. Pulses: Normal pulses. Pulmonary:      Effort: Pulmonary effort is normal. No respiratory distress. Genitourinary:     Pubic Area: No rash. Labia:         Right: No tenderness. Left: Tenderness present. No rash or lesion.            Comments: No obvious cyst or abscess, no area of fluctuance, no drainage, no vaginal discharge  Musculoskeletal:         General: No deformity or signs of injury. Normal range of motion. Cervical back: Normal range of motion. Skin:     General: Skin is warm and dry. Capillary Refill: Capillary refill takes less than 2 seconds. Findings: No rash. Neurological:      General: No focal deficit present. Mental Status: She is alert and oriented to person, place, and time. Cranial Nerves: No cranial nerve deficit. Psychiatric:         Mood and Affect: Mood normal.         Diagnostic Study Results     Labs -   No results found for this or any previous visit (from the past 48 hour(s)). Radiologic Studies -   Results from Hospital Encounter encounter on 11/28/20    XR CHEST PA LAT    Narrative  XR CHEST PA LAT  11/28/2020 3:15 PM ; [facility]    Indication: 37years old; Female ; cough ; implant    Technique: Chest radiographs, 2 views. Comparison: None    Findings: The lungs are clear. The cardiac silhouette and pulmonary vasculature are  normal. The tracheal shadow is unremarkable. There is no evidence for  pneumothorax, pneumoperitoneum, or pleural effusion. The osseous structures are unremarkable for age. Impression  Impression:    No acute cardiopulmonary disease. CT Results  (Last 48 hours)    None          Medical Decision Making   I am the first provider for this patient. I reviewed the vital signs, available nursing notes, past medical history, past surgical history, family history and social history. Vital Signs-Reviewed the patient's vital signs.   Patient Vitals for the past 12 hrs:   Temp Pulse Resp BP SpO2   06/18/21 2243  69 18 129/87 98 %   06/18/21 2141 99.1 °F (37.3 °C) 81 16 (!) 159/104 97 %       Records Reviewed: Nursing Notes and Old Medical Records    Provider Notes (Medical Decision Making):     MDM  Number of Diagnoses or Management Options  Labial pain  Diagnosis management comments: Patient with complaints of left labial pain, moderate tenderness to palpation to the left labia minora however no obvious abscess or fluctuant area noted. No indication for incision and drainage at this time. She should continue with sits baths and will put her on Bactrim to cover for any infected cyst.  She will follow-up with OB/GYN. Amount and/or Complexity of Data Reviewed  Review and summarize past medical records: yes        ED Course:   Initial assessment performed. The patients presenting problems have been discussed, and they are in agreement with the care plan formulated and outlined with them. I have encouraged them to ask questions as they arise throughout their visit. PROCEDURES    Procedures       Disposition     Disposition: DC- Adult Discharges: All of the diagnostic tests were reviewed and questions answered. Diagnosis, care plan and treatment options were discussed. The patient understands the instructions and will follow up as directed. The patients results have been reviewed with them. They have been counseled regarding their diagnosis. The patient verbally convey understanding and agreement of the signs, symptoms, diagnosis, treatment and prognosis and additionally agrees to follow up as recommended with their PCP in 24 - 48 hours. They also agree with the care-plan and convey that all of their questions have been answered. I have also put together some discharge instructions for them that include: 1) educational information regarding their diagnosis, 2) how to care for their diagnosis at home, as well a 3) list of reasons why they would want to return to the ED prior to their follow-up appointment, should their condition change. Discharged    DISCHARGE PLAN:  1.    Current Discharge Medication List      START taking these medications    Details   trimethoprim-sulfamethoxazole (Bactrim DS) 160-800 mg per tablet Take 1 Tablet by mouth two (2) times a day for 7 days. Qty: 14 Tablet, Refills: 0         CONTINUE these medications which have NOT CHANGED    Details   ibuprofen (MOTRIN) 600 mg tablet Take 1 Tab by mouth every six (6) hours as needed for Pain. Qty: 20 Tab, Refills: 0      Symbicort 160-4.5 mcg/actuation HFAA INHALE TWO PUFFS BY MOUTH TWICE DAILY      albuterol (ProAir HFA) 90 mcg/actuation inhaler Take  by inhalation every four (4) hours as needed for Wheezing. metoprolol tartrate (LOPRESSOR) 50 mg tablet Take 50 mg by mouth once. DULoxetine (CYMBALTA) 60 mg capsule Take 60 mg by mouth daily. 2.   Follow-up Information     Follow up With Specialties Details Why Contact Info    Enrique Scheuermann, MD Obstetrics & Gynecology, Gynecology, Obstetrics Schedule an appointment as soon as possible for a visit  for follow up from ER visit [id number] [place] [id number] [place] [address]  [phone]      [address] [place] [address] DEPT Emergency Medicine  As needed, If symptoms worsen [address] [place] [address]  [phone]        3. Return to ED if worse   4. Discharge Medication List as of 6/18/2021 10:37 PM      START taking these medications    Details   trimethoprim-sulfamethoxazole (Bactrim DS) 160-800 mg per tablet Take 1 Tablet by mouth two (2) times a day for 7 days. , Normal, Disp-14 Tablet, R-0         CONTINUE these medications which have NOT CHANGED    Details   ibuprofen (MOTRIN) 600 mg tablet Take 1 Tab by mouth every six (6) hours as needed for Pain., Normal, Disp-20 Tab,R-0      Symbicort 160-4.5 mcg/actuation HFAA INHALE TWO PUFFS BY MOUTH TWICE DAILY, Historical Med, DEEP      albuterol (ProAir HFA) 90 mcg/actuation inhaler Take  by inhalation every four (4) hours as needed for Wheezing., Historical Med      metoprolol tartrate (LOPRESSOR) 50 mg tablet Take 50 mg by mouth once., Historical Med      DULoxetine (CYMBALTA) 60 mg capsule Take 60 mg by mouth daily. , Historical Med             Diagnosis     Clinical Impression:   1.  Labial pain

## 2021-07-02 ENCOUNTER — HOSPITAL ENCOUNTER (OUTPATIENT)
Dept: LAB | Age: 36
Discharge: HOME | End: 2021-07-02

## 2022-01-13 ENCOUNTER — APPOINTMENT (OUTPATIENT)
Dept: GENERAL RADIOLOGY | Age: 37
End: 2022-01-13
Attending: PHYSICIAN ASSISTANT
Payer: MEDICAID

## 2022-01-13 ENCOUNTER — HOSPITAL ENCOUNTER (EMERGENCY)
Age: 37
Discharge: HOME | End: 2022-01-13
Payer: MEDICAID

## 2022-01-13 VITALS
SYSTOLIC BLOOD PRESSURE: 144 MMHG | HEIGHT: 67 IN | HEART RATE: 101 BPM | OXYGEN SATURATION: 98 % | RESPIRATION RATE: 16 BRPM | TEMPERATURE: 98.4 F | DIASTOLIC BLOOD PRESSURE: 100 MMHG | BODY MASS INDEX: 42.38 KG/M2 | WEIGHT: 270 LBS

## 2022-01-13 DIAGNOSIS — U07.1 COVID-19: ICD-10-CM

## 2022-01-13 DIAGNOSIS — J06.9 VIRAL URI WITH COUGH: Primary | ICD-10-CM

## 2022-01-13 LAB
ALBUMIN SERPL-MCNC: 3.9 G/DL (ref 3.5–5)
ALBUMIN/GLOB SERPL: 1.3 {RATIO} (ref 1.1–2.2)
ALP SERPL-CCNC: 98 U/L (ref 45–117)
ALT SERPL-CCNC: 47 U/L (ref 12–78)
ANION GAP SERPL CALC-SCNC: 12 MMOL/L (ref 5–15)
AST SERPL W P-5'-P-CCNC: 32 U/L (ref 15–37)
BASOPHILS # BLD: 0.1 K/UL (ref 0–0.1)
BASOPHILS NFR BLD: 0 % (ref 0–1)
BILIRUB SERPL-MCNC: 0.3 MG/DL (ref 0.2–1)
BUN SERPL-MCNC: 14 MG/DL (ref 6–20)
BUN/CREAT SERPL: 18 (ref 12–20)
CA-I BLD-MCNC: 9.4 MG/DL (ref 8.5–10.1)
CHLORIDE SERPL-SCNC: 102 MMOL/L (ref 97–108)
CO2 SERPL-SCNC: 26 MMOL/L (ref 21–32)
CREAT SERPL-MCNC: 0.8 MG/DL (ref 0.55–1.02)
DIFFERENTIAL METHOD BLD: ABNORMAL
EOSINOPHIL # BLD: 0.1 K/UL (ref 0–0.4)
EOSINOPHIL NFR BLD: 1 % (ref 0–7)
ERYTHROCYTE [DISTWIDTH] IN BLOOD BY AUTOMATED COUNT: 13.1 % (ref 11.5–14.5)
GLOBULIN SER CALC-MCNC: 3.1 G/DL (ref 2–4)
GLUCOSE SERPL-MCNC: 90 MG/DL (ref 65–100)
HCG UR QL: NEGATIVE
HCT VFR BLD AUTO: 45.4 % (ref 35–47)
HGB BLD-MCNC: 15.5 G/DL (ref 11.5–16)
IMM GRANULOCYTES # BLD AUTO: 0 K/UL (ref 0–0.04)
IMM GRANULOCYTES NFR BLD AUTO: 0 % (ref 0–0.5)
LYMPHOCYTES # BLD: 4.5 K/UL (ref 0.8–3.5)
LYMPHOCYTES NFR BLD: 36 % (ref 12–49)
MCH RBC QN AUTO: 29.4 PG (ref 26–34)
MCHC RBC AUTO-ENTMCNC: 34.1 G/DL (ref 30–36.5)
MCV RBC AUTO: 86.1 FL (ref 80–99)
MONOCYTES # BLD: 0.7 K/UL (ref 0–1)
MONOCYTES NFR BLD: 5 % (ref 5–13)
NEUTS SEG # BLD: 7.1 K/UL (ref 1.8–8)
NEUTS SEG NFR BLD: 58 % (ref 32–75)
PLATELET # BLD AUTO: 269 K/UL (ref 150–400)
PMV BLD AUTO: 9.4 FL (ref 8.9–12.9)
POTASSIUM SERPL-SCNC: 3.7 MMOL/L (ref 3.5–5.1)
PROT SERPL-MCNC: 7 G/DL (ref 6.4–8.2)
RBC # BLD AUTO: 5.27 M/UL (ref 3.8–5.2)
SODIUM SERPL-SCNC: 140 MMOL/L (ref 136–145)
WBC # BLD AUTO: 12.4 K/UL (ref 3.6–11)

## 2022-01-13 PROCEDURE — 96374 THER/PROPH/DIAG INJ IV PUSH: CPT

## 2022-01-13 PROCEDURE — 81025 URINE PREGNANCY TEST: CPT

## 2022-01-13 PROCEDURE — 74011250636 HC RX REV CODE- 250/636: Performed by: PHYSICIAN ASSISTANT

## 2022-01-13 PROCEDURE — 99283 EMERGENCY DEPT VISIT LOW MDM: CPT

## 2022-01-13 PROCEDURE — 36415 COLL VENOUS BLD VENIPUNCTURE: CPT

## 2022-01-13 PROCEDURE — 80053 COMPREHEN METABOLIC PANEL: CPT

## 2022-01-13 PROCEDURE — 85025 COMPLETE CBC W/AUTO DIFF WBC: CPT

## 2022-01-13 PROCEDURE — 71046 X-RAY EXAM CHEST 2 VIEWS: CPT

## 2022-01-13 RX ORDER — METHYLPREDNISOLONE 4 MG/1
TABLET ORAL
Qty: 1 DOSE PACK | Refills: 0 | Status: SHIPPED | OUTPATIENT
Start: 2022-01-13

## 2022-01-13 RX ADMIN — METHYLPREDNISOLONE SODIUM SUCCINATE 125 MG: 125 INJECTION, POWDER, FOR SOLUTION INTRAMUSCULAR; INTRAVENOUS at 14:59

## 2022-01-13 RX ADMIN — SODIUM CHLORIDE 1000 ML: 9 INJECTION, SOLUTION INTRAVENOUS at 14:59

## 2022-01-13 NOTE — Clinical Note
[address] [facility] EMERGENCY DEPARTMENT  [address] [facility] [address]  [phone]    Work/School Note    Date: 1/13/2022    To Whom It May concern:    Maxine Chapman was seen and treated today in the emergency room by the following provider(s):  Physician Assistant: En Killian PA-C. Maxine Chapman is excused from work/school on 1/13/2022 through 1/15/2022. She is medically clear to return to work/school on 1/16/2022.          Sincerely,          Edy Kowalski PA-C

## 2022-01-13 NOTE — ED PROVIDER NOTES
EMERGENCY DEPARTMENT HISTORY AND PHYSICAL EXAM      Date: 1/13/2022  Patient Name: Mio Mckeon    History of Presenting Illness     Chief Complaint   Patient presents with    Concern For COVID-19 (Coronavirus)       History Provided By: Patient    HPI: Mio Mckeon, 31 y.o. female with a past medical history significant for HTN and fibromyalgia who presents to the ED with cc of flu-like sx's. Pt reports a one week hx of cough, congestion, sore throat, headache, and body aches. States that her  is home with the same sx's. Pt notes being evaluated for the same last week and has 2 days left of augmentin. Pt notes developing diarrhea and nausea after starting augmentin. She denies treating sx's with any analgesics or antipyretics. She denies any associated sx's or modifying factors. Pt states that she is otherwise well and has no further concerns. She specifically denies any chest pain, shortness of breath, palpitations, leg swelling, fever, chills, abdominal pain, difficulty urinating, dysuria, hematuria, light headedness, dizziness, diaphoresis, or rash. There are no other complaints, changes, or physical findings at this time. PCP: None    No current facility-administered medications on file prior to encounter. Current Outpatient Medications on File Prior to Encounter   Medication Sig Dispense Refill    ibuprofen (MOTRIN) 600 mg tablet Take 1 Tab by mouth every six (6) hours as needed for Pain. 20 Tab 0    Symbicort 160-4.5 mcg/actuation HFAA INHALE TWO PUFFS BY MOUTH TWICE DAILY (Patient not taking: Reported on 1/13/2022)      albuterol (ProAir HFA) 90 mcg/actuation inhaler Take  by inhalation every four (4) hours as needed for Wheezing.  metoprolol tartrate (LOPRESSOR) 50 mg tablet Take 50 mg by mouth once.  DULoxetine (CYMBALTA) 60 mg capsule Take 60 mg by mouth daily.          Past History     Past Medical History:  Past Medical History:   Diagnosis Date    Bartholin cyst     Fibromyalgia     Fibromyalgia     Herniated lumbar intervertebral disc     Hypertension     Sciatica        Past Surgical History:  Past Surgical History:   Procedure Laterality Date    HX APPENDECTOMY      HX ORTHOPAEDIC         Family History:  No family history on file. Social History:  Social History     Tobacco Use    Smoking status: Never Smoker    Smokeless tobacco: Never Used   Substance Use Topics    Alcohol use: No    Drug use: No       Allergies: Allergies   Allergen Reactions    Abilify [Aripiprazole] Other (comments)     hallucinations         Review of Systems     Review of Systems   Constitutional: Negative. Negative for chills, diaphoresis and fever. HENT: Positive for congestion and sore throat. Negative for rhinorrhea. Eyes: Negative. Respiratory: Positive for cough. Negative for chest tightness, shortness of breath and wheezing. Cardiovascular: Negative. Negative for chest pain and palpitations. Gastrointestinal: Positive for diarrhea and nausea. Negative for abdominal pain and vomiting. Genitourinary: Negative. Negative for difficulty urinating, dysuria, flank pain, frequency and hematuria. Musculoskeletal: Positive for myalgias. Skin: Negative. Negative for rash. Neurological: Positive for headaches. Negative for dizziness, weakness, light-headedness and numbness. Psychiatric/Behavioral: Negative. All other systems reviewed and are negative. Physical Exam     Physical Exam  Vitals and nursing note reviewed. Constitutional:       General: She is not in acute distress. Appearance: Normal appearance. She is not ill-appearing or toxic-appearing. HENT:      Head: Normocephalic and atraumatic. Nose: Congestion present. Mouth/Throat:      Mouth: Mucous membranes are dry. Pharynx: Oropharynx is clear. Uvula midline. Posterior oropharyngeal erythema present. No oropharyngeal exudate.       Tonsils: No tonsillar exudate. Eyes:      Extraocular Movements: Extraocular movements intact. Conjunctiva/sclera: Conjunctivae normal.      Pupils: Pupils are equal, round, and reactive to light. Cardiovascular:      Rate and Rhythm: Regular rhythm. Tachycardia present. Pulses: Normal pulses. Heart sounds: Normal heart sounds. No murmur heard. No friction rub. No gallop. Pulmonary:      Effort: Pulmonary effort is normal.      Breath sounds: Examination of the right-upper field reveals wheezing. Wheezing present. No rhonchi or rales. Comments: Coughs on exam  Abdominal:      General: There is no distension. Palpations: Abdomen is soft. Tenderness: There is no abdominal tenderness. There is no guarding or rebound. Musculoskeletal:      Cervical back: Neck supple. No tenderness. Skin:     General: Skin is warm and dry. Capillary Refill: Capillary refill takes less than 2 seconds. Findings: No rash. Neurological:      General: No focal deficit present. Mental Status: She is alert and oriented to person, place, and time. Psychiatric:         Mood and Affect: Mood normal.         Behavior: Behavior normal.         Lab and Diagnostic Study Results     Labs -   No results found for this or any previous visit (from the past 12 hour(s)). Radiologic Studies -   @lastxrresult@  CT Results  (Last 48 hours)    None        CXR Results  (Last 48 hours)    None            Medical Decision Making   - I am the first provider for this patient. - I reviewed the vital signs, available nursing notes, past medical history, past surgical history, family history and social history. - Initial assessment performed. The patients presenting problems have been discussed, and they are in agreement with the care plan formulated and outlined with them. I have encouraged them to ask questions as they arise throughout their visit. Vital Signs-Reviewed the patient's vital signs.   No data found. Records Reviewed: Nursing Notes and Old Medical Records       Provider Notes (Medical Decision Making):     MDM  Number of Diagnoses or Management Options  Diagnosis management comments: Pt presents with acute URI symptoms including nasal congestion, rhinorrhea and sore throat. Pt also has c/o of cough without dyspnea, chest pain or wheezing. Pt is well-appearing with stable vitals and benign exam; symptoms are consistent with an uncomplicated URI. DDx: COVID-19, acute bronchitis, bacterial sinusitis vs. pharyngitis, migraine, flu. Symptomatic therapy suggested: acetaminophen, ibuprofen, antihistamine-decongestant of choice, cough suppressant of choice. Increase fluids, use vaporizer, stay in steamy bathroom tid 15 min prn severe cough, tylenol as needed, rest, avoid smoky areas. Lack of antibiotic effectiveness discussed with her. Symptomatic therapy suggested: gargle for sore throat, use mist at bedside for congestion. Apply facial warm packs for sinus pain or use nasal saline sprays. Follow up prn if not better in 72 hours. Amount and/or Complexity of Data Reviewed  Clinical lab tests: ordered and reviewed  Tests in the radiology section of CPT®: ordered and reviewed  Tests in the medicine section of CPT®: ordered and reviewed  Review and summarize past medical records: yes       ED Course:   Pt has been reassessed and updated on all results and findings. They have no additional complaints or concerns and all of their questions have been answered. Anticipate discharge home shortly. Procedures   Medical Decision Makingedical Decision Making  Performed by: Yuri Reyes PA-C  PROCEDURES:  Procedures       Disposition   Disposition: DC- Adult Discharges: All of the diagnostic tests were reviewed and questions answered. Diagnosis, care plan and treatment options were discussed. The patient understands the instructions and will follow up as directed.  The patients results have been reviewed with them. They have been counseled regarding their diagnosis. The patient verbally convey understanding and agreement of the signs, symptoms, diagnosis, treatment and prognosis and additionally agrees to follow up as recommended with their PCP in 24 - 48 hours. They also agree with the care-plan and convey that all of their questions have been answered. I have also put together some discharge instructions for them that include: 1) educational information regarding their diagnosis, 2) how to care for their diagnosis at home, as well a 3) list of reasons why they would want to return to the ED prior to their follow-up appointment, should their condition change. Discharged    DISCHARGE PLAN:  1. Current Discharge Medication List      CONTINUE these medications which have NOT CHANGED    Details   ibuprofen (MOTRIN) 600 mg tablet Take 1 Tab by mouth every six (6) hours as needed for Pain. Qty: 20 Tab, Refills: 0      Symbicort 160-4.5 mcg/actuation HFAA INHALE TWO PUFFS BY MOUTH TWICE DAILY      albuterol (ProAir HFA) 90 mcg/actuation inhaler Take  by inhalation every four (4) hours as needed for Wheezing. metoprolol tartrate (LOPRESSOR) 50 mg tablet Take 50 mg by mouth once. DULoxetine (CYMBALTA) 60 mg capsule Take 60 mg by mouth daily. 2.   Follow-up Information     Follow up With Specialties Details Why [address]   As needed [address] [place] [address]  [phone]        3. Return to ED if worse   4.    Discharge Medication List as of 1/13/2022  4:56 PM      START taking these medications    Details   methylPREDNISolone (Medrol, Elie,) 4 mg tablet 2-7-31-3-2-1, Normal, Disp-1 Dose Pack, R-0         CONTINUE these medications which have NOT CHANGED    Details   ibuprofen (MOTRIN) 600 mg tablet Take 1 Tab by mouth every six (6) hours as needed for Pain., Normal, Disp-20 Tab,R-0      Symbicort 160-4.5 mcg/actuation HFAA INHALE TWO PUFFS BY MOUTH TWICE DAILY, Historical Med, DEEP      albuterol (ProAir HFA) 90 mcg/actuation inhaler Take  by inhalation every four (4) hours as needed for Wheezing., Historical Med      metoprolol tartrate (LOPRESSOR) 50 mg tablet Take 50 mg by mouth once., Historical Med      DULoxetine (CYMBALTA) 60 mg capsule Take 60 mg by mouth daily. , Historical Med               Diagnosis     Clinical Impression:   1. Viral URI with cough    2. COVID-19        Attestations:    Jm Kowalski PA-C    Please note that this dictation was completed with [employer], the [employer] voice recognition software. Quite often unanticipated grammatical, syntax, homophones, and other interpretive errors are inadvertently transcribed by the computer software. Please disregard these errors. Please excuse any errors that have escaped final proofreading. Thank you.

## 2022-01-13 NOTE — Clinical Note
[address] [facility] EMERGENCY DEPARTMENT  [address] [address]  [phone]    Work/School Note    Date: 1/13/2022     To Whom It May concern:    Graylin Moritz was evaluated by the following provider(s):  Physician Assistant: Radha Meza virus is suspected. Per the CDC guidelines we recommend home isolation until the following conditions are all met:    1. At least five days have passed since symptoms first appeared and/or had a close exposure,   2. After home isolation for five days, wearing a mask around others for the next five days,  3. At least 24 have passed since last fever without the use of fever-reducing medications and  4.  Symptoms (eg cough, shortness of breath) have improved      Sincerely,          [facility] [employer], SHAHRIAR

## 2022-01-13 NOTE — ED TRIAGE NOTES
Pt here c/o increase work of breathing and concern for COVID.  +cough and body aches x1 week.  +congestion and HA.  +n/v/d.   Pt on Augmentin and prednisone without relief

## 2022-01-14 ENCOUNTER — PATIENT OUTREACH (OUTPATIENT)
Dept: CASE MANAGEMENT | Age: 37
End: 2022-01-14

## 2022-01-14 NOTE — PROGRESS NOTES
1/14/2022  12:21 PM    Patient outreach attempt by this ACM today to perform initial post-discharge assessment; lvm requesting a return phone call to this ACM.

## 2022-01-17 NOTE — PROGRESS NOTES
1/17/2022  2:10 PM    Second patient outreach attempt by this ACM to perform initial post-discharge assessment. Unable to reach patient; lvm requesting a return phone call to this ACM. [employer] message routed to patient with COVID-19 resources: Yes    COVID Care Transitions episode resolved at this time due to ACM inability to reach patient.

## 2022-02-22 ENCOUNTER — HOSPITAL ENCOUNTER (EMERGENCY)
Age: 37
Discharge: HOME | End: 2022-02-23
Attending: EMERGENCY MEDICINE
Payer: MEDICAID

## 2022-02-22 VITALS
RESPIRATION RATE: 24 BRPM | SYSTOLIC BLOOD PRESSURE: 144 MMHG | WEIGHT: 286.6 LBS | OXYGEN SATURATION: 98 % | BODY MASS INDEX: 43.44 KG/M2 | HEIGHT: 68 IN | HEART RATE: 125 BPM | TEMPERATURE: 99.2 F | DIASTOLIC BLOOD PRESSURE: 108 MMHG

## 2022-02-22 DIAGNOSIS — R11.2 NON-INTRACTABLE VOMITING WITH NAUSEA, UNSPECIFIED VOMITING TYPE: Primary | ICD-10-CM

## 2022-02-22 LAB
ALBUMIN SERPL-MCNC: 3.6 G/DL (ref 3.5–5)
ALBUMIN/GLOB SERPL: 1.1 {RATIO} (ref 1.1–2.2)
ALP SERPL-CCNC: 73 U/L (ref 45–117)
ALT SERPL-CCNC: 12 U/L (ref 12–78)
ANION GAP SERPL CALC-SCNC: 12 MMOL/L (ref 5–15)
APPEARANCE UR: CLEAR
AST SERPL W P-5'-P-CCNC: 19 U/L (ref 15–37)
BACTERIA URNS QL MICRO: NEGATIVE /HPF
BASOPHILS # BLD: 0 K/UL (ref 0–0.2)
BASOPHILS NFR BLD: 0 % (ref 0–2.5)
BILIRUB SERPL-MCNC: 0.4 MG/DL (ref 0.2–1)
BILIRUB UR QL: NEGATIVE
BUN SERPL-MCNC: 15 MG/DL (ref 6–20)
BUN/CREAT SERPL: 18 (ref 12–20)
CA-I BLD-MCNC: 8.8 MG/DL (ref 8.5–10.1)
CHLORIDE SERPL-SCNC: 103 MMOL/L (ref 97–108)
CO2 SERPL-SCNC: 24 MMOL/L (ref 21–32)
COLOR UR: ABNORMAL
CREAT SERPL-MCNC: 0.82 MG/DL (ref 0.55–1.02)
EOSINOPHIL # BLD: 0 K/UL (ref 0–0.7)
EOSINOPHIL NFR BLD: 0 % (ref 0.9–2.9)
ERYTHROCYTE [DISTWIDTH] IN BLOOD BY AUTOMATED COUNT: 14.9 % (ref 11.5–14.5)
GLOBULIN SER CALC-MCNC: 3.2 G/DL (ref 2–4)
GLUCOSE SERPL-MCNC: 99 MG/DL (ref 65–100)
GLUCOSE UR STRIP.AUTO-MCNC: NEGATIVE MG/DL
HCT VFR BLD AUTO: 41.7 % (ref 36–46)
HGB BLD-MCNC: 14 G/DL (ref 13.5–17.5)
HGB UR QL STRIP: ABNORMAL
KETONES UR QL STRIP.AUTO: ABNORMAL MG/DL
LACTATE SERPL-SCNC: 2 MMOL/L (ref 0.4–2)
LEUKOCYTE ESTERASE UR QL STRIP.AUTO: ABNORMAL
LYMPHOCYTES # BLD: 0.7 K/UL (ref 1–4.8)
LYMPHOCYTES NFR BLD: 8 % (ref 20.5–51.1)
MCH RBC QN AUTO: 29.4 PG (ref 31–34)
MCHC RBC AUTO-ENTMCNC: 33.5 G/DL (ref 31–36)
MCV RBC AUTO: 87.7 FL (ref 80–100)
MONOCYTES # BLD: 0.5 K/UL (ref 0.2–2.4)
MONOCYTES NFR BLD: 5 % (ref 1.7–9.3)
NEUTS SEG # BLD: 8.1 K/UL (ref 1.8–7.7)
NEUTS SEG NFR BLD: 87 % (ref 42–75)
NITRITE UR QL STRIP.AUTO: NEGATIVE
NRBC # BLD: 0.01 K/UL
NRBC BLD-RTO: 0.2 PER 100 WBC
PH UR STRIP: 6.5 [PH] (ref 5–8)
PLATELET # BLD AUTO: 225 K/UL (ref 150–400)
PMV BLD AUTO: 7.5 FL (ref 6.5–11.5)
POTASSIUM SERPL-SCNC: 4 MMOL/L (ref 3.5–5.1)
PROT SERPL-MCNC: 6.8 G/DL (ref 6.4–8.2)
PROT UR STRIP-MCNC: ABNORMAL MG/DL
RBC # BLD AUTO: 4.76 M/UL (ref 4.5–5.9)
RBC #/AREA URNS HPF: NORMAL /HPF (ref 0–3)
SODIUM SERPL-SCNC: 139 MMOL/L (ref 136–145)
SP GR UR REFRACTOMETRY: 1.02 (ref 1–1.03)
UROBILINOGEN UR QL STRIP.AUTO: 0.2 EU/DL (ref 0.2–1)
WBC # BLD AUTO: 9.4 K/UL (ref 4.4–11.3)
WBC URNS QL MICRO: NORMAL /HPF (ref 0–5)

## 2022-02-22 PROCEDURE — 74011250636 HC RX REV CODE- 250/636: Performed by: EMERGENCY MEDICINE

## 2022-02-22 PROCEDURE — 81001 URINALYSIS AUTO W/SCOPE: CPT

## 2022-02-22 PROCEDURE — 85025 COMPLETE CBC W/AUTO DIFF WBC: CPT

## 2022-02-22 PROCEDURE — 96372 THER/PROPH/DIAG INJ SC/IM: CPT

## 2022-02-22 PROCEDURE — 83605 ASSAY OF LACTIC ACID: CPT

## 2022-02-22 PROCEDURE — 36415 COLL VENOUS BLD VENIPUNCTURE: CPT

## 2022-02-22 PROCEDURE — 99282 EMERGENCY DEPT VISIT SF MDM: CPT

## 2022-02-22 PROCEDURE — 80053 COMPREHEN METABOLIC PANEL: CPT

## 2022-02-22 RX ORDER — SODIUM CHLORIDE 9 MG/ML
150 INJECTION, SOLUTION INTRAVENOUS ONCE
Status: COMPLETED | OUTPATIENT
Start: 2022-02-22 | End: 2022-02-22

## 2022-02-22 RX ORDER — PROCHLORPERAZINE EDISYLATE 5 MG/ML
10 INJECTION INTRAMUSCULAR; INTRAVENOUS
Status: COMPLETED | OUTPATIENT
Start: 2022-02-22 | End: 2022-02-22

## 2022-02-22 RX ADMIN — PROCHLORPERAZINE EDISYLATE 10 MG: 5 INJECTION INTRAMUSCULAR; INTRAVENOUS at 23:21

## 2022-02-22 RX ADMIN — SODIUM CHLORIDE 150 ML/HR: 9 INJECTION, SOLUTION INTRAVENOUS at 21:20

## 2022-02-22 NOTE — Clinical Note
[address] [facility] EMERGENCY DEPT  [address] [address]  [phone]    Work/School Note    Date: 2/22/2022    To Whom It May concern:    Clifford Llanes was seen and treated today in the emergency room by the following provider(s):  Attending Provider: Arianna Fischer MD.      Clifford Llanes is excused from work/school on 02/22/22 and 02/23/22. She is medically clear to return to work/school on 2/24/2022.        Sincerely,          Alexandra Parikh

## 2022-02-22 NOTE — Clinical Note
[address] [facility] EMERGENCY DEPT  [address] [address]  [phone]    Work/School Note    Date: 2/22/2022    To Whom It May concern:    Renetta Bowen was seen and treated today in the emergency room by the following provider(s):  Attending Provider: Du Diaz MD.      Renetta Bowen is excused from work/school on 02/22/22 and 02/23/22. She is medically clear to return to work/school on 2/24/2022.        Sincerely,          Abilio Perales MD

## 2022-02-23 NOTE — ED PROVIDER NOTES
Patient presents with complaint of abdominal pain, nausea vomiting and diarrhea for past 24 hours. No fever or chills. No other acute complaints. Past Medical History:   Diagnosis Date    Bartholin cyst     Fibromyalgia     Fibromyalgia     Herniated lumbar intervertebral disc     Hypertension     Sciatica        Past Surgical History:   Procedure Laterality Date    HX APPENDECTOMY      HX ORTHOPAEDIC           History reviewed. No pertinent family history. Social History     Socioeconomic History    Marital status: LIFE PARTNER     Spouse name: Not on file    Number of children: Not on file    Years of education: Not on file    Highest education level: Not on file   Occupational History    Not on file   Tobacco Use    Smoking status: Never Smoker    Smokeless tobacco: Never Used   Vaping Use    Vaping Use: Never used   Substance and Sexual Activity    Alcohol use: No    Drug use: No    Sexual activity: Yes     Partners: Male     Birth control/protection: Implant   Other Topics Concern    Not on file   Social History Narrative    Not on file     Social Determinants of Health     Financial Resource Strain:     Difficulty of Paying Living Expenses: Not on file   Food Insecurity:     Worried About Running Out of Food in the Last Year: Not on file    Yajaira of Food in the Last Year: Not on file   Transportation Needs:     Lack of Transportation (Medical): Not on file    Lack of Transportation (Non-Medical):  Not on file   Physical Activity:     Days of Exercise per Week: Not on file    Minutes of Exercise per Session: Not on file   Stress:     Feeling of Stress : Not on file   Social Connections:     Frequency of Communication with Friends and Family: Not on file    Frequency of Social Gatherings with Friends and Family: Not on file    Attends Protestant Services: Not on file    Active Member of Clubs or Organizations: Not on file    Attends Club or Organization Meetings: Not on file    Marital Status: Not on file   Intimate Partner Violence:     Fear of Current or Ex-Partner: Not on file    Emotionally Abused: Not on file    Physically Abused: Not on file    Sexually Abused: Not on file   Housing Stability:     Unable to Pay for Housing in the Last Year: Not on file    Number of [place] in the Last Year: Not on file    Unstable Housing in the Last Year: Not on file         ALLERGIES: Abilify [aripiprazole]    Review of Systems   Constitutional: Negative. Negative for chills and fever. HENT: Negative. Eyes: Negative. Respiratory: Negative. Negative for shortness of breath. Cardiovascular: Negative. Negative for chest pain. Gastrointestinal: Positive for abdominal pain, diarrhea, nausea and vomiting. Endocrine: Negative. Genitourinary: Negative. Skin: Negative. Allergic/Immunologic: Negative. Neurological: Negative. Hematological: Negative. Psychiatric/Behavioral: Negative. All other systems reviewed and are negative. Vitals:    02/22/22 2026   BP: (!) 144/108   Pulse: (!) 125   Resp: 24   Temp: 99.2 °F (37.3 °C)   SpO2: 98%   Weight: 130 kg (286 lb 9.6 oz)   Height: 5' 8\" (1.727 m)            Physical Exam  Vitals and nursing note reviewed. Constitutional:       Appearance: She is well-developed. HENT:      Head: Normocephalic and atraumatic. Cardiovascular:      Rate and Rhythm: Normal rate and regular rhythm. Heart sounds: Normal heart sounds. Pulmonary:      Breath sounds: Normal breath sounds. Abdominal:      General: Bowel sounds are normal.      Palpations: Abdomen is soft. Musculoskeletal:         General: Normal range of motion. Cervical back: Normal range of motion and neck supple. Neurological:      General: No focal deficit present. Mental Status: She is alert.    Psychiatric:         Mood and Affect: Mood normal.         Behavior: Behavior normal.          MDM       Procedures PROBLEM DIAGNOSES  Problem: Polyp of female genital tract  Assessment and Plan: Preoperative instructions (see paper chart) given to patient with understanding verbalized via telephone for scheduled dilation and curettage on 7/24/2020. Patient instructions to include picking up e-script rx for dynahex-4 soap from patient designated pharmacy and using it as instructed.    Problem: Hypertension  Assessment and Plan: Patient instructed via telephone to take propanolol and amlodipine with a sip of water the morning of surgery. Patient verbalized understanding

## 2022-11-08 ENCOUNTER — HOSPITAL ENCOUNTER (EMERGENCY)
Age: 37
Discharge: HOME | End: 2022-11-08
Attending: EMERGENCY MEDICINE
Payer: MEDICAID

## 2022-11-08 VITALS
HEART RATE: 86 BPM | WEIGHT: 275 LBS | HEIGHT: 68 IN | BODY MASS INDEX: 41.68 KG/M2 | TEMPERATURE: 98.5 F | RESPIRATION RATE: 20 BRPM | OXYGEN SATURATION: 99 % | DIASTOLIC BLOOD PRESSURE: 106 MMHG | SYSTOLIC BLOOD PRESSURE: 165 MMHG

## 2022-11-08 DIAGNOSIS — J04.0 LARYNGITIS: Primary | ICD-10-CM

## 2022-11-08 PROCEDURE — 74011636637 HC RX REV CODE- 636/637: Performed by: EMERGENCY MEDICINE

## 2022-11-08 PROCEDURE — 99283 EMERGENCY DEPT VISIT LOW MDM: CPT

## 2022-11-08 RX ORDER — PREDNISONE 20 MG/1
TABLET ORAL
Qty: 12 TABLET | Refills: 0 | Status: SHIPPED | OUTPATIENT
Start: 2022-11-08

## 2022-11-08 RX ORDER — PREDNISONE 20 MG/1
60 TABLET ORAL ONCE
Status: COMPLETED | OUTPATIENT
Start: 2022-11-08 | End: 2022-11-08

## 2022-11-08 RX ORDER — DEXTROAMPHETAMINE SULFATE, DEXTROAMPHETAMINE SACCHARATE, AMPHETAMINE SULFATE AND AMPHETAMINE ASPARTATE 5; 5; 5; 5 MG/1; MG/1; MG/1; MG/1
CAPSULE, EXTENDED RELEASE ORAL
COMMUNITY
Start: 2022-10-28

## 2022-11-08 RX ORDER — AZITHROMYCIN 250 MG/1
TABLET, FILM COATED ORAL
Qty: 6 TABLET | Refills: 0 | Status: SHIPPED | OUTPATIENT
Start: 2022-11-08

## 2022-11-08 RX ORDER — DIVALPROEX SODIUM 500 MG/1
TABLET, DELAYED RELEASE ORAL
COMMUNITY
Start: 2022-09-20

## 2022-11-08 RX ADMIN — PREDNISONE 60 MG: 20 TABLET ORAL at 15:26

## 2022-11-08 NOTE — ED PROVIDER NOTES
EMERGENCY DEPARTMENT HISTORY AND PHYSICAL EXAM      Date: 11/8/2022  Patient Name: Shellie Carter    History of Presenting Illness     Chief Complaint   Patient presents with    Shortness of Breath    Chest Pain    Chest Congestion    Cough       History Provided By: Patient    HPI: Shellie Carter, 30 y.o. female   presents to the ED with cc of hoarse voice. Patient stated she lost her voice yesterday with mild sore throat. Patient complains of productive cough of yellow sputum and chest congestion and mild shortness of breath. No fever chills. No vomiting or diarrhea. No obvious exposed to COVID-19. Normal urination with normal p.o. intake. PCP: None    No current facility-administered medications on file prior to encounter. Current Outpatient Medications on File Prior to Encounter   Medication Sig Dispense Refill    divalproex DR (DEPAKOTE) 500 mg tablet TAKE 1 TABLET BY MOUTH AT BEDTIME. DO NOT CRUSH, CHEW, OR SPLIT. Adderall XR 20 mg XR capsule TAKE 1 CAPSULE BY MOUTH EVERY DAY IN THE MORNING DO NOT CRUSH OR CHEW.      metoprolol tartrate (LOPRESSOR) 50 mg tablet Take 50 mg by mouth once. DULoxetine (CYMBALTA) 60 mg capsule Take 60 mg by mouth daily. methylPREDNISolone (Medrol, Elie,) 4 mg tablet 0-6-64-3-2-1 1 Dose Pack 0    ibuprofen (MOTRIN) 600 mg tablet Take 1 Tab by mouth every six (6) hours as needed for Pain. 20 Tab 0    Symbicort 160-4.5 mcg/actuation HFAA INHALE TWO PUFFS BY MOUTH TWICE DAILY (Patient not taking: Reported on 1/13/2022)      albuterol (ProAir HFA) 90 mcg/actuation inhaler Take  by inhalation every four (4) hours as needed for Wheezing.          Past History     Past Medical History:  Past Medical History:   Diagnosis Date    Bartholin cyst     Fibromyalgia     Fibromyalgia     Herniated lumbar intervertebral disc     Hypertension     Sciatica        Past Surgical History:  Past Surgical History:   Procedure Laterality Date    HX APPENDECTOMY      HX ORTHOPAEDIC         Family History:  History reviewed. No pertinent family history. Social History:  Social History     Tobacco Use    Smoking status: Never    Smokeless tobacco: Never   Vaping Use    Vaping Use: Never used   Substance Use Topics    Alcohol use: No    Drug use: No       Allergies: Allergies   Allergen Reactions    Abilify [Aripiprazole] Other (comments)     hallucinations         Review of Systems   Review of Systems   Constitutional:  Negative for chills and fever. HENT:  Positive for rhinorrhea and sore throat. Eyes:  Negative for discharge. Respiratory:  Positive for cough. Negative for shortness of breath. Cardiovascular:  Negative for chest pain. Gastrointestinal:  Negative for abdominal pain and vomiting. Genitourinary:  Negative for dysuria. Musculoskeletal:  Negative for joint swelling. Skin:  Negative for rash. Neurological:  Negative for headaches. Psychiatric/Behavioral:  Negative for suicidal ideas. All other systems reviewed and are negative. Physical Exam   Physical Exam  Vitals and nursing note reviewed. Constitutional:       General: She is not in acute distress. Appearance: Normal appearance. She is well-developed. She is not ill-appearing or toxic-appearing. HENT:      Head: Normocephalic and atraumatic. Nose: No congestion. Mouth/Throat:      Mouth: Mucous membranes are moist.      Pharynx: No oropharyngeal exudate or posterior oropharyngeal erythema. Comments: Hoarse voice without stridor  Eyes:      Conjunctiva/sclera: Conjunctivae normal.   Cardiovascular:      Rate and Rhythm: Regular rhythm. Heart sounds: Normal heart sounds. Pulmonary:      Effort: Pulmonary effort is normal.      Breath sounds: Normal breath sounds. No wheezing, rhonchi or rales. Abdominal:      General: Bowel sounds are normal.      Palpations: Abdomen is soft. Tenderness: There is no abdominal tenderness.    Musculoskeletal: General: No deformity. Cervical back: Neck supple. No rigidity. Skin:     General: Skin is warm and dry. Neurological:      General: No focal deficit present. Mental Status: She is alert. Psychiatric:         Mood and Affect: Mood normal.       Diagnostic Study Results     Labs -   No results found for this or any previous visit (from the past 12 hour(s)). Radiologic Studies -   No orders to display     CT Results  (Last 48 hours)      None          CXR Results  (Last 48 hours)      None              Medical Decision Making   I am the first provider for this patient. I reviewed the vital signs, available nursing notes, past medical history, past surgical history, family history and social history. Vital Signs-Reviewed the patient's vital signs. Patient Vitals for the past 12 hrs:   Temp Pulse Resp BP SpO2   11/08/22 1517 -- -- -- -- 99 %   11/08/22 1507 98.5 °F (36.9 °C) 86 20 (!) 165/106 99 %       Records Reviewed:     Provider Notes (Medical Decision Making):       ED Course:   Initial assessment performed. The patients presenting problems have been discussed, and they are in agreement with the care plan formulated and outlined with them. I have encouraged them to ask questions as they arise throughout their visit. PROCEDURES      Disposition: Condition stable   DC- Adult Discharges: All of the diagnostic tests were reviewed and questions answered. Diagnosis, care plan and treatment options were discussed. understand instructions and will follow up as directed. The patients results have been reviewed with them. They have been counseled regarding their diagnosis. The patient verbally convey understanding and agreement of the signs, symptoms, diagnosis, treatment and prognosis and additionally agrees to follow up as recommended. They also agree with the care-plan and convey that all of their questions have been answered.   I have also put together some discharge instructions for them that include: 1) educational information regarding their diagnosis, 2) how to care for their diagnosis at home, as well a 3) list of reasons why they would want to return to the ED prior to their follow-up appointment, should their condition change. PLAN:  1. Current Discharge Medication List        START taking these medications    Details   azithromycin (Zithromax Z-Elie) 250 mg tablet As instructed in the pack  Qty: 6 Tablet, Refills: 0  Start date: 11/8/2022      predniSONE (DELTASONE) 20 mg tablet 3 tablets for 2 days then 2 tablets for 2 days then 1 tablet for 2 day  Qty: 12 Tablet, Refills: 0  Start date: 11/8/2022           2. Follow-up Information       Follow up With Specialties Details Why Contact Info    Follow up with your primary care physician  Schedule an appointment as soon as possible for a visit in 3 days As needed           Return to ED if worse     Diagnosis     Clinical Impression:   1. Laryngitis        Please note that this dictation was completed with [employer], the computer voice recognition software. Quite often unanticipated grammatical, syntax, homophones, and other interpretive errors are inadvertently transcribed by the computer software. Please disregard these errors. Please excuse any errors that have escaped final proofreading. Thank you.

## 2022-11-08 NOTE — Clinical Note
[address] [facility] [address]  [phone]    Work/School Note    Date: 11/8/2022    To Whom It May concern:    Misbah Kwon was seen and treated today in the emergency room by the following provider(s):  Attending Provider: Jian Jordan MD.      Misbah Kwon is excused from work/school on 11/8/2022 through 11/10/2022. She is medically clear to return to work/school on 11/11/2022.          Sincerely,          Joao Asher MD